# Patient Record
Sex: MALE | Race: BLACK OR AFRICAN AMERICAN | Employment: OTHER | URBAN - METROPOLITAN AREA
[De-identification: names, ages, dates, MRNs, and addresses within clinical notes are randomized per-mention and may not be internally consistent; named-entity substitution may affect disease eponyms.]

---

## 2024-05-02 ENCOUNTER — HOSPITAL ENCOUNTER (INPATIENT)
Age: 89
LOS: 1 days | Discharge: HOME | End: 2024-05-03
Attending: EMERGENCY MEDICINE | Admitting: STUDENT IN AN ORGANIZED HEALTH CARE EDUCATION/TRAINING PROGRAM
Payer: MEDICARE

## 2024-05-02 ENCOUNTER — APPOINTMENT (OUTPATIENT)
Dept: GENERAL RADIOLOGY | Age: 89
End: 2024-05-02
Payer: MEDICARE

## 2024-05-02 DIAGNOSIS — N18.6 ESRD ON HEMODIALYSIS (HCC): ICD-10-CM

## 2024-05-02 DIAGNOSIS — R53.1 GENERALIZED WEAKNESS: ICD-10-CM

## 2024-05-02 DIAGNOSIS — Z99.2 ESRD ON HEMODIALYSIS (HCC): ICD-10-CM

## 2024-05-02 DIAGNOSIS — I47.10 PAROXYSMAL SUPRAVENTRICULAR TACHYCARDIA (HCC): Primary | ICD-10-CM

## 2024-05-02 PROBLEM — I48.91 A-FIB (HCC): Status: ACTIVE | Noted: 2024-05-02

## 2024-05-02 LAB
ALBUMIN SERPL-MCNC: 4.4 G/DL (ref 3.4–5)
ALBUMIN/GLOB SERPL: 1.3 {RATIO} (ref 1.1–2.2)
ALP SERPL-CCNC: 105 U/L (ref 40–129)
ALT SERPL-CCNC: 11 U/L (ref 10–40)
ANION GAP SERPL CALCULATED.3IONS-SCNC: 16 MMOL/L (ref 3–16)
ANTI-XA UNFRAC HEPARIN: <0.1 IU/ML (ref 0.3–0.7)
APTT BLD: 33.8 SEC (ref 22.1–36.4)
AST SERPL-CCNC: 31 U/L (ref 15–37)
BACTERIA URNS QL MICRO: ABNORMAL /HPF
BASOPHILS # BLD: 0 K/UL (ref 0–0.2)
BASOPHILS NFR BLD: 0.5 %
BILIRUB SERPL-MCNC: 0.5 MG/DL (ref 0–1)
BILIRUB UR QL STRIP.AUTO: NEGATIVE
BUN SERPL-MCNC: 30 MG/DL (ref 7–20)
CALCIUM SERPL-MCNC: 10 MG/DL (ref 8.3–10.6)
CHLORIDE SERPL-SCNC: 96 MMOL/L (ref 99–110)
CLARITY UR: ABNORMAL
CO2 SERPL-SCNC: 23 MMOL/L (ref 21–32)
COLOR UR: YELLOW
CREAT SERPL-MCNC: 4.4 MG/DL (ref 0.8–1.3)
DEPRECATED RDW RBC AUTO: 14.5 % (ref 12.4–15.4)
EOSINOPHIL # BLD: 0.2 K/UL (ref 0–0.6)
EOSINOPHIL NFR BLD: 2 %
EPI CELLS #/AREA URNS AUTO: 5 /HPF (ref 0–5)
FLUAV RNA RESP QL NAA+PROBE: NOT DETECTED
FLUBV RNA RESP QL NAA+PROBE: NOT DETECTED
GFR SERPLBLD CREATININE-BSD FMLA CKD-EPI: 12 ML/MIN/{1.73_M2}
GLUCOSE SERPL-MCNC: 207 MG/DL (ref 70–99)
GLUCOSE UR STRIP.AUTO-MCNC: NEGATIVE MG/DL
HAV IGM SERPL QL IA: NORMAL
HBV CORE IGM SERPL QL IA: NORMAL
HBV SURFACE AB SERPL IA-ACNC: 5.56 MIU/ML
HBV SURFACE AG SERPL QL IA: NORMAL
HCT VFR BLD AUTO: 33.9 % (ref 40.5–52.5)
HCV AB SERPL QL IA: NORMAL
HGB BLD-MCNC: 11.1 G/DL (ref 13.5–17.5)
HGB UR QL STRIP.AUTO: NEGATIVE
HYALINE CASTS #/AREA URNS AUTO: 0 /LPF (ref 0–8)
INR PPP: 1.08 (ref 0.85–1.15)
KETONES UR STRIP.AUTO-MCNC: NEGATIVE MG/DL
LEUKOCYTE ESTERASE UR QL STRIP.AUTO: ABNORMAL
LIPASE SERPL-CCNC: 32 U/L (ref 13–60)
LYMPHOCYTES # BLD: 0.8 K/UL (ref 1–5.1)
LYMPHOCYTES NFR BLD: 10.1 %
MCH RBC QN AUTO: 32.4 PG (ref 26–34)
MCHC RBC AUTO-ENTMCNC: 32.8 G/DL (ref 31–36)
MCV RBC AUTO: 98.7 FL (ref 80–100)
MONOCYTES # BLD: 0.8 K/UL (ref 0–1.3)
MONOCYTES NFR BLD: 9.9 %
NEUTROPHILS # BLD: 6 K/UL (ref 1.7–7.7)
NEUTROPHILS NFR BLD: 77.5 %
NITRITE UR QL STRIP.AUTO: NEGATIVE
PH UR STRIP.AUTO: 7.5 [PH] (ref 5–8)
PLATELET # BLD AUTO: 184 K/UL (ref 135–450)
PMV BLD AUTO: 7.6 FL (ref 5–10.5)
POTASSIUM SERPL-SCNC: 4.5 MMOL/L (ref 3.5–5.1)
PROT SERPL-MCNC: 7.9 G/DL (ref 6.4–8.2)
PROT UR STRIP.AUTO-MCNC: ABNORMAL MG/DL
PROTHROMBIN TIME: 14.2 SEC (ref 11.9–14.9)
RBC # BLD AUTO: 3.44 M/UL (ref 4.2–5.9)
RBC CLUMPS #/AREA URNS AUTO: 1 /HPF (ref 0–4)
SARS-COV-2 RNA RESP QL NAA+PROBE: NOT DETECTED
SODIUM SERPL-SCNC: 135 MMOL/L (ref 136–145)
SP GR UR STRIP.AUTO: 1.02 (ref 1–1.03)
TROPONIN, HIGH SENSITIVITY: 126 NG/L (ref 0–22)
TROPONIN, HIGH SENSITIVITY: 167 NG/L (ref 0–22)
TROPONIN, HIGH SENSITIVITY: 178 NG/L (ref 0–22)
TROPONIN, HIGH SENSITIVITY: 77 NG/L (ref 0–22)
TSH SERPL DL<=0.005 MIU/L-ACNC: 2.47 UIU/ML (ref 0.27–4.2)
TSH SERPL DL<=0.005 MIU/L-ACNC: 3.53 UIU/ML (ref 0.27–4.2)
UA COMPLETE W REFLEX CULTURE PNL UR: YES
UA DIPSTICK W REFLEX MICRO PNL UR: YES
URN SPEC COLLECT METH UR: ABNORMAL
UROBILINOGEN UR STRIP-ACNC: 1 E.U./DL
WBC # BLD AUTO: 7.8 K/UL (ref 4–11)
WBC #/AREA URNS AUTO: 12 /HPF (ref 0–5)

## 2024-05-02 PROCEDURE — 5A1D70Z PERFORMANCE OF URINARY FILTRATION, INTERMITTENT, LESS THAN 6 HOURS PER DAY: ICD-10-PCS | Performed by: INTERNAL MEDICINE

## 2024-05-02 PROCEDURE — 96374 THER/PROPH/DIAG INJ IV PUSH: CPT

## 2024-05-02 PROCEDURE — 93306 TTE W/DOPPLER COMPLETE: CPT

## 2024-05-02 PROCEDURE — 84443 ASSAY THYROID STIM HORMONE: CPT

## 2024-05-02 PROCEDURE — 80074 ACUTE HEPATITIS PANEL: CPT

## 2024-05-02 PROCEDURE — 1200000000 HC SEMI PRIVATE

## 2024-05-02 PROCEDURE — 87086 URINE CULTURE/COLONY COUNT: CPT

## 2024-05-02 PROCEDURE — 93356 MYOCRD STRAIN IMG SPCKL TRCK: CPT

## 2024-05-02 PROCEDURE — 86706 HEP B SURFACE ANTIBODY: CPT

## 2024-05-02 PROCEDURE — 90935 HEMODIALYSIS ONE EVALUATION: CPT

## 2024-05-02 PROCEDURE — 36415 COLL VENOUS BLD VENIPUNCTURE: CPT

## 2024-05-02 PROCEDURE — 99285 EMERGENCY DEPT VISIT HI MDM: CPT

## 2024-05-02 PROCEDURE — 84484 ASSAY OF TROPONIN QUANT: CPT

## 2024-05-02 PROCEDURE — 2500000003 HC RX 250 WO HCPCS: Performed by: EMERGENCY MEDICINE

## 2024-05-02 PROCEDURE — 6360000002 HC RX W HCPCS: Performed by: INTERNAL MEDICINE

## 2024-05-02 PROCEDURE — 85730 THROMBOPLASTIN TIME PARTIAL: CPT

## 2024-05-02 PROCEDURE — 83690 ASSAY OF LIPASE: CPT

## 2024-05-02 PROCEDURE — 85520 HEPARIN ASSAY: CPT

## 2024-05-02 PROCEDURE — 2580000003 HC RX 258: Performed by: STUDENT IN AN ORGANIZED HEALTH CARE EDUCATION/TRAINING PROGRAM

## 2024-05-02 PROCEDURE — 80053 COMPREHEN METABOLIC PANEL: CPT

## 2024-05-02 PROCEDURE — 81001 URINALYSIS AUTO W/SCOPE: CPT

## 2024-05-02 PROCEDURE — 85610 PROTHROMBIN TIME: CPT

## 2024-05-02 PROCEDURE — 93005 ELECTROCARDIOGRAM TRACING: CPT | Performed by: EMERGENCY MEDICINE

## 2024-05-02 PROCEDURE — 6370000000 HC RX 637 (ALT 250 FOR IP): Performed by: STUDENT IN AN ORGANIZED HEALTH CARE EDUCATION/TRAINING PROGRAM

## 2024-05-02 PROCEDURE — 87636 SARSCOV2 & INF A&B AMP PRB: CPT

## 2024-05-02 PROCEDURE — 85025 COMPLETE CBC W/AUTO DIFF WBC: CPT

## 2024-05-02 PROCEDURE — 6360000002 HC RX W HCPCS: Performed by: STUDENT IN AN ORGANIZED HEALTH CARE EDUCATION/TRAINING PROGRAM

## 2024-05-02 PROCEDURE — 71045 X-RAY EXAM CHEST 1 VIEW: CPT

## 2024-05-02 RX ORDER — SODIUM CHLORIDE 9 MG/ML
INJECTION, SOLUTION INTRAVENOUS PRN
Status: DISCONTINUED | OUTPATIENT
Start: 2024-05-02 | End: 2024-05-03 | Stop reason: HOSPADM

## 2024-05-02 RX ORDER — CLOPIDOGREL BISULFATE 75 MG/1
75 TABLET ORAL DAILY
Status: DISCONTINUED | OUTPATIENT
Start: 2024-05-02 | End: 2024-05-03 | Stop reason: HOSPADM

## 2024-05-02 RX ORDER — DILTIAZEM HYDROCHLORIDE 5 MG/ML
10 INJECTION INTRAVENOUS ONCE
Status: COMPLETED | OUTPATIENT
Start: 2024-05-02 | End: 2024-05-02

## 2024-05-02 RX ORDER — BRIMONIDINE TARTRATE 2 MG/ML
1 SOLUTION/ DROPS OPHTHALMIC 2 TIMES DAILY
Status: DISCONTINUED | OUTPATIENT
Start: 2024-05-03 | End: 2024-05-03 | Stop reason: HOSPADM

## 2024-05-02 RX ORDER — HEPARIN SODIUM 10000 [USP'U]/100ML
5-30 INJECTION, SOLUTION INTRAVENOUS CONTINUOUS
Status: DISCONTINUED | OUTPATIENT
Start: 2024-05-02 | End: 2024-05-03

## 2024-05-02 RX ORDER — FLUTICASONE PROPIONATE 50 MCG
1 SPRAY, SUSPENSION (ML) NASAL DAILY PRN
Status: DISCONTINUED | OUTPATIENT
Start: 2024-05-02 | End: 2024-05-03 | Stop reason: HOSPADM

## 2024-05-02 RX ORDER — HEPARIN SODIUM 5000 [USP'U]/ML
5000 INJECTION, SOLUTION INTRAVENOUS; SUBCUTANEOUS EVERY 8 HOURS SCHEDULED
Status: DISCONTINUED | OUTPATIENT
Start: 2024-05-02 | End: 2024-05-02

## 2024-05-02 RX ORDER — CETIRIZINE HYDROCHLORIDE 10 MG/1
5 TABLET ORAL DAILY
Status: DISCONTINUED | OUTPATIENT
Start: 2024-05-02 | End: 2024-05-03 | Stop reason: HOSPADM

## 2024-05-02 RX ORDER — ACETAMINOPHEN 325 MG/1
650 TABLET ORAL EVERY 6 HOURS PRN
Status: DISCONTINUED | OUTPATIENT
Start: 2024-05-02 | End: 2024-05-03 | Stop reason: HOSPADM

## 2024-05-02 RX ORDER — ACETAMINOPHEN 650 MG/1
650 SUPPOSITORY RECTAL EVERY 6 HOURS PRN
Status: DISCONTINUED | OUTPATIENT
Start: 2024-05-02 | End: 2024-05-03 | Stop reason: HOSPADM

## 2024-05-02 RX ORDER — SODIUM CHLORIDE 0.9 % (FLUSH) 0.9 %
5-40 SYRINGE (ML) INJECTION PRN
Status: DISCONTINUED | OUTPATIENT
Start: 2024-05-02 | End: 2024-05-03 | Stop reason: HOSPADM

## 2024-05-02 RX ORDER — DORZOLAMIDE HCL 20 MG/ML
1 SOLUTION/ DROPS OPHTHALMIC 2 TIMES DAILY
Status: DISCONTINUED | OUTPATIENT
Start: 2024-05-02 | End: 2024-05-03 | Stop reason: HOSPADM

## 2024-05-02 RX ORDER — HEPARIN SODIUM 1000 [USP'U]/ML
60 INJECTION, SOLUTION INTRAVENOUS; SUBCUTANEOUS ONCE
Status: COMPLETED | OUTPATIENT
Start: 2024-05-02 | End: 2024-05-03

## 2024-05-02 RX ORDER — ONDANSETRON 2 MG/ML
4 INJECTION INTRAMUSCULAR; INTRAVENOUS EVERY 6 HOURS PRN
Status: DISCONTINUED | OUTPATIENT
Start: 2024-05-02 | End: 2024-05-03 | Stop reason: HOSPADM

## 2024-05-02 RX ORDER — LACTOBACILLUS RHAMNOSUS GG 10B CELL
1 CAPSULE ORAL 2 TIMES DAILY WITH MEALS
Status: DISCONTINUED | OUTPATIENT
Start: 2024-05-02 | End: 2024-05-03 | Stop reason: HOSPADM

## 2024-05-02 RX ORDER — ONDANSETRON 4 MG/1
4 TABLET, ORALLY DISINTEGRATING ORAL EVERY 8 HOURS PRN
Status: DISCONTINUED | OUTPATIENT
Start: 2024-05-02 | End: 2024-05-03 | Stop reason: HOSPADM

## 2024-05-02 RX ORDER — BRIMONIDINE TARTRATE 2 MG/ML
1 SOLUTION/ DROPS OPHTHALMIC 2 TIMES DAILY
Status: DISCONTINUED | OUTPATIENT
Start: 2024-05-02 | End: 2024-05-02

## 2024-05-02 RX ORDER — DORZOLAMIDE HYDROCHLORIDE AND TIMOLOL MALEATE 20; 5 MG/ML; MG/ML
1 SOLUTION/ DROPS OPHTHALMIC 2 TIMES DAILY
Status: DISCONTINUED | OUTPATIENT
Start: 2024-05-02 | End: 2024-05-02 | Stop reason: CLARIF

## 2024-05-02 RX ORDER — TIMOLOL MALEATE 5 MG/ML
1 SOLUTION/ DROPS OPHTHALMIC 2 TIMES DAILY
Status: DISCONTINUED | OUTPATIENT
Start: 2024-05-02 | End: 2024-05-03 | Stop reason: HOSPADM

## 2024-05-02 RX ORDER — ASPIRIN 81 MG/1
81 TABLET ORAL DAILY
Status: DISCONTINUED | OUTPATIENT
Start: 2024-05-02 | End: 2024-05-03 | Stop reason: HOSPADM

## 2024-05-02 RX ORDER — HYDRALAZINE HYDROCHLORIDE 50 MG/1
1 TABLET, FILM COATED ORAL 3 TIMES DAILY
COMMUNITY
Start: 2023-06-07

## 2024-05-02 RX ORDER — HYDRALAZINE HYDROCHLORIDE 25 MG/1
25 TABLET, FILM COATED ORAL 3 TIMES DAILY
Status: DISCONTINUED | OUTPATIENT
Start: 2024-05-02 | End: 2024-05-02 | Stop reason: SDUPTHER

## 2024-05-02 RX ORDER — DORZOLAMIDE HYDROCHLORIDE AND TIMOLOL MALEATE 20; 5 MG/ML; MG/ML
1 SOLUTION/ DROPS OPHTHALMIC 2 TIMES DAILY
COMMUNITY
Start: 2024-04-05

## 2024-05-02 RX ORDER — HEPARIN SODIUM 1000 [USP'U]/ML
30 INJECTION, SOLUTION INTRAVENOUS; SUBCUTANEOUS PRN
Status: DISCONTINUED | OUTPATIENT
Start: 2024-05-02 | End: 2024-05-03

## 2024-05-02 RX ORDER — DILTIAZEM HCL IN NACL,ISO-OSM 125 MG/125
2.5-15 PLASTIC BAG, INJECTION (ML) INTRAVENOUS CONTINUOUS
Status: DISPENSED | OUTPATIENT
Start: 2024-05-02 | End: 2024-05-03

## 2024-05-02 RX ORDER — HEPARIN SODIUM 1000 [USP'U]/ML
3500 INJECTION, SOLUTION INTRAVENOUS; SUBCUTANEOUS PRN
Status: DISCONTINUED | OUTPATIENT
Start: 2024-05-02 | End: 2024-05-03 | Stop reason: HOSPADM

## 2024-05-02 RX ORDER — HYDRALAZINE HYDROCHLORIDE 25 MG/1
25 TABLET, FILM COATED ORAL 3 TIMES DAILY
COMMUNITY
Start: 2023-07-19

## 2024-05-02 RX ORDER — DILTIAZEM HYDROCHLORIDE 300 MG/1
300 CAPSULE, COATED, EXTENDED RELEASE ORAL DAILY
Status: ON HOLD | COMMUNITY
Start: 2024-01-26 | End: 2024-05-03 | Stop reason: HOSPADM

## 2024-05-02 RX ORDER — POLYETHYLENE GLYCOL 3350 17 G/17G
17 POWDER, FOR SOLUTION ORAL DAILY PRN
Status: DISCONTINUED | OUTPATIENT
Start: 2024-05-02 | End: 2024-05-03 | Stop reason: HOSPADM

## 2024-05-02 RX ORDER — SODIUM CHLORIDE 0.9 % (FLUSH) 0.9 %
5-40 SYRINGE (ML) INJECTION EVERY 12 HOURS SCHEDULED
Status: DISCONTINUED | OUTPATIENT
Start: 2024-05-02 | End: 2024-05-03 | Stop reason: HOSPADM

## 2024-05-02 RX ORDER — HYDRALAZINE HYDROCHLORIDE 25 MG/1
75 TABLET, FILM COATED ORAL 3 TIMES DAILY
Status: DISCONTINUED | OUTPATIENT
Start: 2024-05-02 | End: 2024-05-03 | Stop reason: HOSPADM

## 2024-05-02 RX ORDER — HEPARIN SODIUM 1000 [USP'U]/ML
60 INJECTION, SOLUTION INTRAVENOUS; SUBCUTANEOUS PRN
Status: DISCONTINUED | OUTPATIENT
Start: 2024-05-02 | End: 2024-05-03

## 2024-05-02 RX ADMIN — SODIUM CHLORIDE, PRESERVATIVE FREE 10 ML: 5 INJECTION INTRAVENOUS at 21:19

## 2024-05-02 RX ADMIN — DORZOLAMIDE HYDROCHLORIDE 1 DROP: 20 SOLUTION/ DROPS OPHTHALMIC at 21:23

## 2024-05-02 RX ADMIN — CLOPIDOGREL BISULFATE 75 MG: 75 TABLET ORAL at 14:09

## 2024-05-02 RX ADMIN — DILTIAZEM HYDROCHLORIDE 10 MG: 5 INJECTION, SOLUTION INTRAVENOUS at 08:34

## 2024-05-02 RX ADMIN — DORZOLAMIDE HYDROCHLORIDE 1 DROP: 20 SOLUTION/ DROPS OPHTHALMIC at 14:09

## 2024-05-02 RX ADMIN — TIMOLOL MALEATE 1 DROP: 5 SOLUTION OPHTHALMIC at 21:22

## 2024-05-02 RX ADMIN — BRIMONIDINE TARTRATE 1 DROP: 2 SOLUTION/ DROPS OPHTHALMIC at 14:08

## 2024-05-02 RX ADMIN — Medication 1 CAPSULE: at 21:19

## 2024-05-02 RX ADMIN — HEPARIN SODIUM 5000 UNITS: 5000 INJECTION INTRAVENOUS; SUBCUTANEOUS at 14:09

## 2024-05-02 RX ADMIN — CETIRIZINE HYDROCHLORIDE 5 MG: 10 TABLET, FILM COATED ORAL at 21:19

## 2024-05-02 RX ADMIN — ASPIRIN 81 MG: 81 TABLET, COATED ORAL at 14:09

## 2024-05-02 RX ADMIN — BRIMONIDINE TARTRATE 1 DROP: 2 SOLUTION/ DROPS OPHTHALMIC at 21:22

## 2024-05-02 RX ADMIN — ACETAMINOPHEN 650 MG: 325 TABLET ORAL at 14:15

## 2024-05-02 RX ADMIN — HYDRALAZINE HYDROCHLORIDE 75 MG: 25 TABLET ORAL at 14:09

## 2024-05-02 RX ADMIN — HEPARIN SODIUM 3500 UNITS: 1000 INJECTION INTRAVENOUS; SUBCUTANEOUS at 19:07

## 2024-05-02 RX ADMIN — WATER 1000 MG: 1 INJECTION INTRAMUSCULAR; INTRAVENOUS; SUBCUTANEOUS at 21:18

## 2024-05-02 ASSESSMENT — LIFESTYLE VARIABLES
HOW MANY STANDARD DRINKS CONTAINING ALCOHOL DO YOU HAVE ON A TYPICAL DAY: PATIENT DOES NOT DRINK
HOW OFTEN DO YOU HAVE A DRINK CONTAINING ALCOHOL: NEVER

## 2024-05-02 ASSESSMENT — PAIN SCALES - GENERAL: PAINLEVEL_OUTOF10: 3

## 2024-05-02 ASSESSMENT — PAIN - FUNCTIONAL ASSESSMENT: PAIN_FUNCTIONAL_ASSESSMENT: NONE - DENIES PAIN

## 2024-05-02 ASSESSMENT — PAIN DESCRIPTION - LOCATION: LOCATION: HEAD

## 2024-05-02 NOTE — PROGRESS NOTES
Pt taken to echo. HD nurse called while pt was off floor. Plan is for transport to take pt from echo to HD.

## 2024-05-02 NOTE — PROGRESS NOTES
Treatment time: 3  hrs    Net UF: 1000 ml     Pre weight: 49.8 kg  Post weight: 48.8 kg     Access used: Rtdc  Access function:  tolerated well,  BFR 350ml/min     Medications or blood products given: heparin dwells     Regular outpatient schedule: TBD     Summary of response to treatment: Pt tolerated well. Pt remained stable throughout entire treatment and upon exiting the hemodialysis suite.      Copy of dialysis treatment record placed in chart, to be scanned into EMR.

## 2024-05-02 NOTE — ED PROVIDER NOTES
narrow complex tachycardia with nonspecific ST-T morphology, suspect likely atrial flutter, no STEMI      Radiology  XR CHEST PORTABLE   Final Result   No acute pulmonary process.             Any applicable radiology studies including x-ray, CT, MRI, and/or ultrasound, were reviewed independently by me in addition to the radiologist.  I reviewed all radiology images and reports as well from this evaluation.    Labs  Results for orders placed or performed during the hospital encounter of 05/02/24   COVID-19 & Influenza Combo    Specimen: Nasopharyngeal Swab   Result Value Ref Range    SARS-CoV-2 RNA, RT PCR NOT DETECTED NOT DETECTED    Influena B NOT DETECTED NOT DETECTED    Influzena A NOT DETECTED NOT DETECTED   CBC with Auto Differential   Result Value Ref Range    WBC 7.8 4.0 - 11.0 K/uL    RBC 3.44 (L) 4.20 - 5.90 M/uL    Hemoglobin 11.1 (L) 13.5 - 17.5 g/dL    Hematocrit 33.9 (L) 40.5 - 52.5 %    MCV 98.7 80.0 - 100.0 fL    MCH 32.4 26.0 - 34.0 pg    MCHC 32.8 31.0 - 36.0 g/dL    RDW 14.5 12.4 - 15.4 %    Platelets 184 135 - 450 K/uL    MPV 7.6 5.0 - 10.5 fL    Neutrophils % 77.5 %    Lymphocytes % 10.1 %    Monocytes % 9.9 %    Eosinophils % 2.0 %    Basophils % 0.5 %    Neutrophils Absolute 6.0 1.7 - 7.7 K/uL    Lymphocytes Absolute 0.8 (L) 1.0 - 5.1 K/uL    Monocytes Absolute 0.8 0.0 - 1.3 K/uL    Eosinophils Absolute 0.2 0.0 - 0.6 K/uL    Basophils Absolute 0.0 0.0 - 0.2 K/uL   Comprehensive Metabolic Panel w/ Reflex to MG   Result Value Ref Range    Sodium 135 (L) 136 - 145 mmol/L    Potassium reflex Magnesium 4.5 3.5 - 5.1 mmol/L    Chloride 96 (L) 99 - 110 mmol/L    CO2 23 21 - 32 mmol/L    Anion Gap 16 3 - 16    Glucose 207 (H) 70 - 99 mg/dL    BUN 30 (H) 7 - 20 mg/dL    Creatinine 4.4 (H) 0.8 - 1.3 mg/dL    Est, Glom Filt Rate 12 (A) >60    Calcium 10.0 8.3 - 10.6 mg/dL    Total Protein 7.9 6.4 - 8.2 g/dL    Albumin 4.4 3.4 - 5.0 g/dL    Albumin/Globulin Ratio 1.3 1.1 - 2.2    Total Bilirubin 0.5 0.0 -  Remainder of laboratory evaluation consistent with known ESRD, no critical electrolyte derangement.  Patient denies history of cardiac dysrhythmia, however this is documented on his past medical history and patient has apparently taken diltiazem in the past, unclear if he is still taking this medication currently.  Patient received intravenous diltiazem after which he converted to normal sinus rhythm confirmed on repeat EKG.  Given patient's advanced age, cardiac dysrhythmia with troponin elevation and need for routine dialysis today, will plan for admission for further evaluation and care.  Case discussed with internal medicine team who will admit.  Patient alert, hemodynamically stable and in no distress at time of admission.    During the patient's ED course, the patient was given:  Medications   dilTIAZem injection 10 mg (10 mg IntraVENous Given 5/2/24 0834)     Followed by   dilTIAZem HCl-Sodium Chloride 125 mg / 125 mL infusion (0 mg/hr IntraVENous Held 5/2/24 0904)        Consults:  None        History obtained from: Patient and EMS    Pertinent social determinants of health: None applicable    Chronic conditions potentially affecting care:  ESRD on hemodialysis, diabetes    Review of other records:  Inpatient notes from outside hospital admission from 5/11/23 and Outpatient notes by PCP office from 11/3/23    Reassessment:  See MDM for details of medications given and reassessment      I Dr. Love am the primary clinician of record.      Critical Care Time    Upon my evaluation, this patient had a high probability of imminent or life-threatening deterioration due to cardiac dysrhythmia requiring intravenous cardioactive medications which required my direct attention, intervention, and personal management.    The total critical care time personally spent while evaluating and treating this patient was 32 minutes exclusive of any time spent doing separately billable procedures.  This includes time at the

## 2024-05-02 NOTE — H&P
CC : Tachycardia    History Obtained From:  patient    HISTORY OF PRESENT ILLNESS:    Patient is a 89 yo M with PMHx of ESRD on HD (TThSa, Dry wt 54 kg, TDC access), HTN, HLD, GERD, Lumbar radiculopathy, AAA s/p repair in 2018 who presented from his dialysis session with tachycardia. Patient endorses that for the past couple of days he has felt more fatigued. He denies any CP, SOB, orthopnea or PND. He claims that today he continued to not feel well and at his dialysis session. Patient denies fevers, chills, nausea, vomiting, chest pain, shortness of breath, diarrhea, constipation, dysuria, urinary frequency or urgency.     Plan:     -Echo  -Continue Dilt gtt for now, transition to his home p.o Dilt tomorrow morning  -IV Rocephin  -Probiotics  -Hold Hydralazine for now in light of tachycardia/SVT    Assessment:   Devonte Carrillo Jr. is a 89 y.o. male with PMH of ESRD on HD (TThSa, Dry wt 54 kg, TDC access), HTN, HLD, GERD, Lumbar radiculopathy, AAA s/p repair in 2018 who was admitted with Tachycardia    SVT  Patient does have hx of SVT. Per cardiology, Dr. Ornelas, \"He had SVT at [facility] when he had the AAA reprair and is on diltiazem for this. He has had no recurrence.\"    UTI  Patient endorses that he still makes urine. His UA showed trace leucocyte esterase, 12 WBC, 2+ bacteria    ESRD on HD  Patient went into ESRD after he had his AAA repaired. He is on TThSa schedule, Dry wt 54 kg, TDC access  -Nephrology consulted    CAD  At home, patient in on Plavix + ASA    AAA s/p repair  In Oct 2018 he underwent an endovascular repair of the AAA by Dr Sotelo with a favorable result    HTN  At home, patient is on Hydralazine, Losartan    GERD  At home, patient is on Protonix    Code Status: Full Code   FEN: ADULT DIET; Regular; Low Sodium (2 gm); Low Potassium (Less than 3000 mg/day); 1800 ml   DVT PPX: [] Lovenox, [x]Heparin, [] SCDs, [] Eliquis, [] Xarelto, [] Coumadin  DISPO: Continue mgmt of acute issues    Jamari N Patricia,  He has never used smokeless tobacco.  ETOH:   reports no history of alcohol use.  DRUGS : denies use  Patient currently lives alone    Family History:       Problem Relation Age of Onset    Diabetes Mother     Stroke Father        DATA:    Labs:  CBC:   Recent Labs     05/02/24  0801   WBC 7.8   HGB 11.1*   HCT 33.9*          BMP:   Recent Labs     05/02/24  0801   *   K 4.5   CL 96*   CO2 23   BUN 30*   CREATININE 4.4*   GLUCOSE 207*     LFT's:   Recent Labs     05/02/24  0801   AST 31   ALT 11   BILITOT 0.5   ALKPHOS 105     Troponin: No results for input(s): \"TROPONINI\" in the last 72 hours.  BNP:No results for input(s): \"BNP\" in the last 72 hours.  ABGs: No results for input(s): \"PHART\", \"RAF4QPA\", \"PO2ART\" in the last 72 hours.  INR: No results for input(s): \"INR\" in the last 72 hours.    U/A:  Recent Labs     05/02/24  1414   COLORU Yellow   PHUR 7.5   WBCUA 12*   RBCUA 1   BACTERIA 2+*   CLARITYU CLOUDY*   LEUKOCYTESUR TRACE*   UROBILINOGEN 1.0   BILIRUBINUR Negative   BLOODU Negative   GLUCOSEU Negative       XR CHEST PORTABLE   Final Result   No acute pulmonary process.

## 2024-05-02 NOTE — PROGRESS NOTES
Patient seen in ED, room 11.  Admission completed with the following exceptions:  4 Eyes Assessment, Immunizations, Vaccines, Rights and Responsibilities, Orientation to room, Plan of Care, Education/Learning Assessment and Education Plan, white board, height and weight, pain assessment and head to toe assessment.  Patient is alert and oriented X 4.  Patient lives in a bilevel house with his wife and is being admitted for A-fib.  Home Medications as well as Outside Sources have been verbally reviewed with patient and updated if appropriate.  Medication reconciliation is now Completed.  All questions answered.

## 2024-05-02 NOTE — ACP (ADVANCE CARE PLANNING)
Advanced Care Planning Note    Purpose of Encounter: Advanced care planning in light of tachycardia  Parties In Attendance: Patient[employer]res (Healthcare POA)  Decisional Capacity: Yes  Subjective: Patient has fatigue  Objective: Cr 4.4  Goals of Care Determination: Patient wants Full support  Plan:  Dilt drip, IV Abx, Echo  Code Status: Full   Time spent on Advanced care Plannin minutes  Advanced Care Planning Documents: Completed advanced directives on chart, Tres, is the Healthcare POA.    Jamari Gomez MD  2024 5:40 PM

## 2024-05-02 NOTE — FLOWSHEET NOTE
05/02/24 1251   Vital Signs   Temp 98.5 °F (36.9 °C)   Temp Source Temporal   Pulse 77   Heart Rate Source Monitor   Respirations 18   BP (!) 152/83   MAP (Calculated) 106   Patient Position Semi fowlers   Pain Assessment   Pain Assessment None - Denies Pain   Oxygen Therapy   SpO2 100 %   O2 Device None (Room air)     Pt admitted to room 5911. Oriented to room and call light system.  Sons at bedside. Pt NSR on tele. Stated he did not get his HD treatment tonight d/t elevated HR and pt not feeling well.  Updated on POC. Fall precautions in place for pts safety.

## 2024-05-03 VITALS
DIASTOLIC BLOOD PRESSURE: 70 MMHG | WEIGHT: 109.6 LBS | TEMPERATURE: 96.7 F | OXYGEN SATURATION: 100 % | SYSTOLIC BLOOD PRESSURE: 149 MMHG | HEART RATE: 80 BPM | BODY MASS INDEX: 17.61 KG/M2 | HEIGHT: 66 IN | RESPIRATION RATE: 16 BRPM

## 2024-05-03 DIAGNOSIS — I47.10 SVT (SUPRAVENTRICULAR TACHYCARDIA) (HCC): Primary | ICD-10-CM

## 2024-05-03 DIAGNOSIS — R00.0 TACHYCARDIA: Primary | ICD-10-CM

## 2024-05-03 DIAGNOSIS — R00.0 TACHYCARDIA: ICD-10-CM

## 2024-05-03 LAB
ALBUMIN SERPL-MCNC: 4 G/DL (ref 3.4–5)
ALBUMIN/GLOB SERPL: 1.2 {RATIO} (ref 1.1–2.2)
ALP SERPL-CCNC: 98 U/L (ref 40–129)
ALT SERPL-CCNC: 10 U/L (ref 10–40)
ANION GAP SERPL CALCULATED.3IONS-SCNC: 13 MMOL/L (ref 3–16)
ANTI-XA UNFRAC HEPARIN: 0.2 IU/ML (ref 0.3–0.7)
ANTI-XA UNFRAC HEPARIN: 0.37 IU/ML (ref 0.3–0.7)
AST SERPL-CCNC: 23 U/L (ref 15–37)
BACTERIA UR CULT: NORMAL
BASOPHILS # BLD: 0 K/UL (ref 0–0.2)
BASOPHILS NFR BLD: 0.6 %
BILIRUB SERPL-MCNC: 0.4 MG/DL (ref 0–1)
BUN SERPL-MCNC: 17 MG/DL (ref 7–20)
CALCIUM SERPL-MCNC: 10.1 MG/DL (ref 8.3–10.6)
CHLORIDE SERPL-SCNC: 101 MMOL/L (ref 99–110)
CO2 SERPL-SCNC: 24 MMOL/L (ref 21–32)
CREAT SERPL-MCNC: 3.5 MG/DL (ref 0.8–1.3)
DEPRECATED RDW RBC AUTO: 14.4 % (ref 12.4–15.4)
EKG ATRIAL RATE: 80 BPM
EKG ATRIAL RATE: 86 BPM
EKG DIAGNOSIS: NORMAL
EKG P AXIS: 47 DEGREES
EKG P AXIS: 49 DEGREES
EKG P-R INTERVAL: 168 MS
EKG P-R INTERVAL: 310 MS
EKG Q-T INTERVAL: 318 MS
EKG Q-T INTERVAL: 380 MS
EKG Q-T INTERVAL: 400 MS
EKG QRS DURATION: 104 MS
EKG QRS DURATION: 82 MS
EKG QRS DURATION: 90 MS
EKG QTC CALCULATION (BAZETT): 454 MS
EKG QTC CALCULATION (BAZETT): 461 MS
EKG QTC CALCULATION (BAZETT): 483 MS
EKG R AXIS: -30 DEGREES
EKG R AXIS: -34 DEGREES
EKG R AXIS: -42 DEGREES
EKG T AXIS: 106 DEGREES
EKG T AXIS: 41 DEGREES
EKG T AXIS: 84 DEGREES
EKG VENTRICULAR RATE: 139 BPM
EKG VENTRICULAR RATE: 80 BPM
EKG VENTRICULAR RATE: 86 BPM
EOSINOPHIL # BLD: 0.2 K/UL (ref 0–0.6)
EOSINOPHIL NFR BLD: 2.1 %
GFR SERPLBLD CREATININE-BSD FMLA CKD-EPI: 15 ML/MIN/{1.73_M2}
GLUCOSE SERPL-MCNC: 85 MG/DL (ref 70–99)
HCT VFR BLD AUTO: 32.7 % (ref 40.5–52.5)
HGB BLD-MCNC: 10.8 G/DL (ref 13.5–17.5)
LYMPHOCYTES # BLD: 1.7 K/UL (ref 1–5.1)
LYMPHOCYTES NFR BLD: 21.5 %
MAGNESIUM SERPL-MCNC: 2 MG/DL (ref 1.8–2.4)
MCH RBC QN AUTO: 32.5 PG (ref 26–34)
MCHC RBC AUTO-ENTMCNC: 33.1 G/DL (ref 31–36)
MCV RBC AUTO: 98 FL (ref 80–100)
MONOCYTES # BLD: 1.1 K/UL (ref 0–1.3)
MONOCYTES NFR BLD: 13.5 %
NEUTROPHILS # BLD: 4.9 K/UL (ref 1.7–7.7)
NEUTROPHILS NFR BLD: 62.3 %
PLATELET # BLD AUTO: 168 K/UL (ref 135–450)
PMV BLD AUTO: 7.6 FL (ref 5–10.5)
POTASSIUM SERPL-SCNC: 5.1 MMOL/L (ref 3.5–5.1)
PROT SERPL-MCNC: 7.4 G/DL (ref 6.4–8.2)
RBC # BLD AUTO: 3.34 M/UL (ref 4.2–5.9)
SODIUM SERPL-SCNC: 138 MMOL/L (ref 136–145)
TROPONIN, HIGH SENSITIVITY: 170 NG/L (ref 0–22)
TROPONIN, HIGH SENSITIVITY: 171 NG/L (ref 0–22)
WBC # BLD AUTO: 7.8 K/UL (ref 4–11)

## 2024-05-03 PROCEDURE — 93010 ELECTROCARDIOGRAM REPORT: CPT | Performed by: INTERNAL MEDICINE

## 2024-05-03 PROCEDURE — 85025 COMPLETE CBC W/AUTO DIFF WBC: CPT

## 2024-05-03 PROCEDURE — 6370000000 HC RX 637 (ALT 250 FOR IP): Performed by: INTERNAL MEDICINE

## 2024-05-03 PROCEDURE — 99223 1ST HOSP IP/OBS HIGH 75: CPT | Performed by: INTERNAL MEDICINE

## 2024-05-03 PROCEDURE — 97165 OT EVAL LOW COMPLEX 30 MIN: CPT

## 2024-05-03 PROCEDURE — 97530 THERAPEUTIC ACTIVITIES: CPT

## 2024-05-03 PROCEDURE — 97535 SELF CARE MNGMENT TRAINING: CPT

## 2024-05-03 PROCEDURE — 80053 COMPREHEN METABOLIC PANEL: CPT

## 2024-05-03 PROCEDURE — 84484 ASSAY OF TROPONIN QUANT: CPT

## 2024-05-03 PROCEDURE — 94760 N-INVAS EAR/PLS OXIMETRY 1: CPT

## 2024-05-03 PROCEDURE — 85520 HEPARIN ASSAY: CPT

## 2024-05-03 PROCEDURE — 83735 ASSAY OF MAGNESIUM: CPT

## 2024-05-03 PROCEDURE — 93005 ELECTROCARDIOGRAM TRACING: CPT | Performed by: STUDENT IN AN ORGANIZED HEALTH CARE EDUCATION/TRAINING PROGRAM

## 2024-05-03 PROCEDURE — 2580000003 HC RX 258: Performed by: STUDENT IN AN ORGANIZED HEALTH CARE EDUCATION/TRAINING PROGRAM

## 2024-05-03 PROCEDURE — 6370000000 HC RX 637 (ALT 250 FOR IP): Performed by: STUDENT IN AN ORGANIZED HEALTH CARE EDUCATION/TRAINING PROGRAM

## 2024-05-03 PROCEDURE — 97161 PT EVAL LOW COMPLEX 20 MIN: CPT

## 2024-05-03 PROCEDURE — 6360000002 HC RX W HCPCS: Performed by: STUDENT IN AN ORGANIZED HEALTH CARE EDUCATION/TRAINING PROGRAM

## 2024-05-03 PROCEDURE — 36415 COLL VENOUS BLD VENIPUNCTURE: CPT

## 2024-05-03 RX ORDER — METOPROLOL SUCCINATE 25 MG/1
75 TABLET, EXTENDED RELEASE ORAL DAILY
Qty: 270 TABLET | Refills: 0 | Status: SHIPPED | OUTPATIENT
Start: 2024-05-03 | End: 2024-08-01

## 2024-05-03 RX ORDER — LABETALOL HYDROCHLORIDE 5 MG/ML
10 INJECTION, SOLUTION INTRAVENOUS EVERY 6 HOURS PRN
Status: DISCONTINUED | OUTPATIENT
Start: 2024-05-03 | End: 2024-05-03 | Stop reason: HOSPADM

## 2024-05-03 RX ADMIN — HEPARIN SODIUM 3000 UNITS: 1000 INJECTION INTRAVENOUS; SUBCUTANEOUS at 00:01

## 2024-05-03 RX ADMIN — DILTIAZEM HYDROCHLORIDE 300 MG: 180 CAPSULE, EXTENDED RELEASE ORAL at 08:05

## 2024-05-03 RX ADMIN — BRIMONIDINE TARTRATE 1 DROP: 2 SOLUTION/ DROPS OPHTHALMIC at 08:09

## 2024-05-03 RX ADMIN — ACETAMINOPHEN 650 MG: 325 TABLET ORAL at 00:12

## 2024-05-03 RX ADMIN — METOPROLOL SUCCINATE 75 MG: 25 TABLET, EXTENDED RELEASE ORAL at 14:40

## 2024-05-03 RX ADMIN — HEPARIN SODIUM 12 UNITS/KG/HR: 10000 INJECTION, SOLUTION INTRAVENOUS at 00:03

## 2024-05-03 RX ADMIN — DORZOLAMIDE HYDROCHLORIDE 1 DROP: 20 SOLUTION/ DROPS OPHTHALMIC at 08:09

## 2024-05-03 RX ADMIN — Medication 1 CAPSULE: at 08:05

## 2024-05-03 RX ADMIN — CLOPIDOGREL BISULFATE 75 MG: 75 TABLET ORAL at 08:05

## 2024-05-03 RX ADMIN — ASPIRIN 81 MG: 81 TABLET, COATED ORAL at 08:05

## 2024-05-03 RX ADMIN — HYDRALAZINE HYDROCHLORIDE 75 MG: 25 TABLET ORAL at 14:40

## 2024-05-03 RX ADMIN — TIMOLOL MALEATE 1 DROP: 5 SOLUTION OPHTHALMIC at 08:09

## 2024-05-03 RX ADMIN — SODIUM CHLORIDE, PRESERVATIVE FREE 10 ML: 5 INJECTION INTRAVENOUS at 08:05

## 2024-05-03 RX ADMIN — HYDRALAZINE HYDROCHLORIDE 75 MG: 25 TABLET ORAL at 08:05

## 2024-05-03 ASSESSMENT — ENCOUNTER SYMPTOMS
VOMITING: 0
DIARRHEA: 0
COUGH: 0
WHEEZING: 0
ABDOMINAL PAIN: 0
BACK PAIN: 0
SHORTNESS OF BREATH: 0
NAUSEA: 0
CONSTIPATION: 0

## 2024-05-03 NOTE — CONSULTS
Financial Resource Strain: Not on file   Food Insecurity: No Food Insecurity (5/2/2024)    Hunger Vital Sign     Worried About Running Out of Food in the Last Year: Never true     Ran Out of Food in the Last Year: Never true   Transportation Needs: No Transportation Needs (5/2/2024)    PRAPARE - Transportation     Lack of Transportation (Medical): No     Lack of Transportation (Non-Medical): No   Physical Activity: Not on file   Stress: Not on file   Social Connections: Not on file   Intimate Partner Violence: Not on file   Housing Stability: Low Risk  (5/2/2024)    Housing Stability Vital Sign     Unable to Pay for Housing in the Last Year: No     Number of Places Lived in the Last Year: 1     Unstable Housing in the Last Year: No       Family History:       Problem Relation Age of Onset    Diabetes Mother     Stroke Father        Review of Systems   Constitutional:  Negative for chills and fever.   Respiratory:  Negative for cough, shortness of breath and wheezing.    Cardiovascular:  Negative for chest pain, palpitations and leg swelling.   Gastrointestinal:  Negative for abdominal pain, constipation, diarrhea, nausea and vomiting.   Genitourinary:  Negative for difficulty urinating, dysuria, hematuria and urgency.   Musculoskeletal:  Negative for arthralgias, back pain and myalgias.   Skin:  Negative for pallor and rash.   Neurological:  Negative for dizziness, tremors, syncope and headaches.   Psychiatric/Behavioral:  Negative for confusion and hallucinations.        PHYSICAL EXAM:      Vitals:    BP (!) 149/70   Pulse 80   Temp (!) 96.7 °F (35.9 °C) (Temporal)   Resp 16   Ht 1.676 m (5' 6\")   Wt 49.7 kg (109 lb 9.6 oz)   SpO2 100%   BMI 17.69 kg/m²   I/O last 3 completed shifts:  In: -   Out: 275 [Urine:275]  No intake/output data recorded.    Physical Exam:  General : Elderly male, AAOx3, not in pain or respiratory distress, see me in the chair  HEENT : normocephalic, atraumatic, mucosa moist, no  the care of this patient.  I will continue to follow along.  Please call with questions.    Beata Ocampo MD  Office Phone : [phone]  5/3/2024

## 2024-05-03 NOTE — PROGRESS NOTES
Pt had episode of tachycardia 150s lasting 9 minutes, pt asymptomatic. EKG taken, showing A-fib RVR. Pt converted to NSR during end of EKG reading.

## 2024-05-03 NOTE — PROGRESS NOTES
CLINICAL PHARMACY NOTE: MEDS TO BEDS    Total # of Prescriptions Filled: 1   The following medications were delivered to the patient:  METOPROLOL SUCCINATE ER     Additional Documentation:  Delivered to patients room = signed  Ok to be delivered per RN La Gonzalez - Pharmacy Tech.

## 2024-05-03 NOTE — CARE COORDINATION
Case Management -  Discharge Note      Patient Name: Devonte Carrillo Jr.                   YOB: 1929            Readmission Risk (Low < 19, Mod (19-27), High > 27): Readmission Risk Score: 16.5    Current PCP: Karen Barboza    ([facility]) Important Message from Medicare:    Date: 5/2/24    PT AM-PAC: 20 /24  OT AM-PAC: 19 /24    Patient/patient representative has been educated on the benefits of Home Health Care as well as the possible risks of declining recommended services. Patient/patient representative has acknowledged the information provided and decided on the following discharge plan. Patient/ patient representative has been provided freedom of choice regarding service provider, supported by basic dialogue that supports the patient's individualized plan of care/goals.    Spoke with patient and family about home health services. Patient and family decline this service, stating he drives himself several times a week, anywhere he needs and they are not interested in home health care services.    Financial    Payor: [employer] MEDICARE / Plan: [employer] GOLD PLUS HMO / Product Type: *No Product type* /     Pharmacy:  Potential assistance Purchasing Medications: No  Meds-to-Beds request: Yes      [id number] - [place], [place] - [address] - P [phone] - F [phone]  [facility] [address]  [facility] [address]  Phone: [phone] Fax: [phone]    [facility] - [facility] [address] - P [phone] - F [phone] [address] [facility] [address]  Phone: [phone] Fax: [phone]      Notes:    Patient lives with wife and is very active. He is still driving himself to dialysis and any place he thinks he needs to go.  Their son provides great support for patient and family as needed.     Analilia Srinivasan RNCM

## 2024-05-03 NOTE — DISCHARGE SUMMARY
V2.0  Discharge Summary    Name:  Devonte Carrillo Jr. /Age/Sex: 3/3/1929 (89 y.o. male)   Admit Date: 2024  Discharging Provider: Jamari Gomez MD   MRN & CSN:  [id number] & [id number] Attending:  Jamari Gomez MD       Brief HPI: Devonte Carrillo Jr. is a 89 y.o. male with PMH of ESRD on HD (TThSa, Dry wt 54 kg, TDC access), HTN, HLD, GERD, Lumbar radiculopathy, AAA s/p repair in  who was admitted with SVT from his dialysis session. Patient was seen by cardiology. His p.o Dilt was changed to p.o Toprol 75mg daily. Patient improved and was discharged home.     Brief Problem Focused Hospital Course:     SVT  Patient does have hx of SVT. Per cardiology, Dr. Ornelas, \"He had SVT at [facility] when he had the AAA reprair and is on diltiazem for this. He has had no recurrence.\"     UTI  Patient endorses that he still makes urine. His UA showed trace leucocyte esterase, 12 WBC, 2+ bacteria     ESRD on HD  Patient went into ESRD after he had his AAA repaired. He is on TThSa schedule, Dry wt 54 kg, TDC access  -Nephrology consulted     CAD  At home, patient in on Plavix + ASA     AAA s/p repair  In Oct 2018 he underwent an endovascular repair of the AAA by Dr Sotelo with a favorable result.     HTN  At home, patient is on Hydralazine, Losartan     GERD  At home, patient is on Protonix    The patient expressed appropriate understanding of, and agreement with the discharge recommendations, medications, and plan.     Consults this admission:  IP CONSULT TO HOSPITALIST  IP CONSULT TO NEPHROLOGY  IP CONSULT TO CARDIOLOGY    Discharge Instruction:   Primary care physician: Karen Barboza within 2 weeks  Disposition: Discharged to: [x]Home, []HHC, []SNF, []Acute Rehab, []Hospice []AMA  Condition on discharge: Stable    Physical Exam:   Physical Exam  HENT:      Head: Normocephalic and atraumatic.      Mouth/Throat:      Mouth: Mucous membranes are moist.   Cardiovascular:      Rate and Rhythm: Regular rhythm.      Pulses: Normal  5/2/2024 8:20 am COMPARISON: 09/02/2012 HISTORY: ORDERING SYSTEM PROVIDED HISTORY: tachycardia, weakness TECHNOLOGIST PROVIDED HISTORY: Reason for exam:->tachycardia, weakness Reason for Exam: tachycardia, weakness FINDINGS: Right IJ dual-lumen catheter tip projects over the inferior right atrium. The lungs are clear.  Heart size and vascularity are stable.  There is no pneumothorax or effusion.     No acute pulmonary process.       CBC:   Recent Labs     05/02/24  0801 05/03/24  0607   WBC 7.8 7.8   HGB 11.1* 10.8*    168     BMP:    Recent Labs     05/02/24  0801 05/03/24  0607   * 138   K 4.5 5.1   CL 96* 101   CO2 23 24   BUN 30* 17   CREATININE 4.4* 3.5*   GLUCOSE 207* 85     Hepatic:   Recent Labs     05/02/24  0801 05/03/24  0607   AST 31 23   ALT 11 10   BILITOT 0.5 0.4   ALKPHOS 105 98     Lipids:   Lab Results   Component Value Date/Time    CHOL 222 05/14/2014 09:50 AM    HDL 42 05/14/2014 09:50 AM    HDL 36 01/10/2012 10:50 AM    TRIG 159 05/14/2014 09:50 AM     Hemoglobin A1C:   Lab Results   Component Value Date/Time    LABA1C 6.0 09/07/2016 10:17 AM     TSH:   Lab Results   Component Value Date/Time    TSH 3.53 05/02/2024 09:02 AM     Troponin: No results found for: \"TROPONINT\"  Lactic Acid: No results for input(s): \"LACTA\" in the last 72 hours.  BNP: No results for input(s): \"PROBNP\" in the last 72 hours.  UA:  Lab Results   Component Value Date/Time    NITRU Negative 05/02/2024 02:14 PM    COLORU Yellow 05/02/2024 02:14 PM    PHUR 7.5 05/02/2024 02:14 PM    PHUR 6.0 09/12/2014 07:14 PM    WBCUA 12 05/02/2024 02:14 PM    RBCUA 1 05/02/2024 02:14 PM    BACTERIA 2+ 05/02/2024 02:14 PM    CLARITYU CLOUDY 05/02/2024 02:14 PM    LEUKOCYTESUR TRACE 05/02/2024 02:14 PM    UROBILINOGEN 1.0 05/02/2024 02:14 PM    BILIRUBINUR Negative 05/02/2024 02:14 PM    BLOODU Negative 05/02/2024 02:14 PM    GLUCOSEU Negative 05/02/2024 02:14 PM    KETUA Negative 05/02/2024 02:14 PM     Urine Cultures:   Lab

## 2024-05-03 NOTE — PLAN OF CARE
Problem: ABCDS Injury Assessment  Goal: Absence of physical injury  Outcome: Progressing     Problem: Discharge Planning  Goal: Discharge to home or other facility with appropriate resources  Outcome: Progressing     Problem: Safety - Adult  Goal: Free from fall injury  Outcome: Progressing     Problem: Pain  Goal: Verbalizes/displays adequate comfort level or baseline comfort level  Outcome: Progressing     Problem: Cardiovascular - Adult  Goal: Maintains optimal cardiac output and hemodynamic stability  Outcome: Progressing  Goal: Absence of cardiac dysrhythmias or at baseline  Outcome: Progressing     Problem: Metabolic/Fluid and Electrolytes - Adult  Goal: Hemodynamic stability and optimal renal function maintained  Outcome: Progressing

## 2024-05-03 NOTE — PROGRESS NOTES
[facility] - Inpatient Rehabilitation Department   Phone: [phone]    Occupational Therapy    [x] Initial Evaluation            [] Daily Treatment Note         [] Discharge Summary      Patient: Devonte Carrillo Jr.   : 3/3/1929   MRN: [id number]   Date of Service:  5/3/2024    Admitting Diagnosis:  A-fib (HCC)  Current Admission Summary: 89 y.o. Male with pertinent past medical history of ESRD on hemodialysis, abdominal aortic aneurysm, hypertension, diabetes who was brought in by EMS transportation for rapid heart rate. Patient proceeded to his routine dialysis clinic this morning, per EMS report had received less than 30 minutes of dialysis when he was noticed to be tachycardic with heart rate in the 130s. He was thus transported to the emergency department for further evaluation. Patient reports sensation of generalized weakness and fatigue ongoing for the past 3 days. Associated with some mild shortness of breath. He denies any fevers, chills, palpitations or chest pain. Patient denies any history of abnormal heart rhythms, however on record review has noted to have a history of cardiac arrhythmia and has previously been on diltiazem.   Past Medical History:  has a past medical history of AAA (abdominal aortic aneurysm) (HCC), CAD (coronary artery disease), Cataract, Diabetes mellitus (HCC), GERD (gastroesophageal reflux disease), Glaucoma, Hypercholesteremia, Hypertension, Other disorders of kidney and ureter, and Unspecified cerebral artery occlusion with cerebral infarction.  Past Surgical History:  has a past surgical history that includes hernia repair; colectomy; Revision Colostomy; Abdomen surgery; Cholecystectomy (); Cardiac electrophysiology mapping and ablation; and Cataract removal with implant (Right).    Discharge Recommendations: Devonte Carrillo Jr. scored a 19/24 on the AM-PAC ADL Inpatient form. Current research shows that an AM-PAC score of 18 or greater is typically associated

## 2024-05-03 NOTE — DISCHARGE INSTRUCTIONS
Please call and make an appointment with your PCP within 1 week  Please call and make an appointment with Cardiology  Please take all your medications as prescribed including any new ones on discharge

## 2024-05-03 NOTE — PROGRESS NOTES
PIV removed, no complications. Tele box off. This RN reviewed pt's discharge paperwork, all questions answered. Pt's family in the room and will take pt home.

## 2024-05-03 NOTE — CONSULTS
[facility]  Cardiology Note  [phone]      Chief Complaint   Patient presents with    Tachycardia     Patient brought from dialysis for high heart rate. Denies pain, reports he feels weak.        History of Present Illness:  Devonte Carrillo Jr. is a 89 y.o. patient PMHx ESRD, AAA, HTN, DM, CVAs who presented to the hospital with complaints of tachycardia    Patient went to HD session this AM and was noted to have HR in 130's for which he was sent to the ED. Patient was asymptomatic throughout. He has vague symptoms of lightheadedness and fatigue but these do not temporally relate to SVT events. They seem to occur on HD days. No CP, palpitations, orthopnea, PND    EKG in the ED with regular SVT. He had one prior SVT event surrounding his AAA repair in 2018. No strips available for review. Treated with dilt.     Past Medical History:   has a past medical history of AAA (abdominal aortic aneurysm) (HCC), CAD (coronary artery disease), Cataract, Diabetes mellitus (HCC), GERD (gastroesophageal reflux disease), Glaucoma, Hypercholesteremia, Hypertension, Other disorders of kidney and ureter, and Unspecified cerebral artery occlusion with cerebral infarction.    Surgical History:   has a past surgical history that includes hernia repair; colectomy; Revision Colostomy; Abdomen surgery; Cholecystectomy (2023); Cardiac electrophysiology mapping and ablation; and Cataract removal with implant (Right).     Social History:   reports that he quit smoking about 28 years ago. His smoking use included cigars. He has never used smokeless tobacco. He reports that he does not drink alcohol and does not use drugs.     Family History:  Family History   Problem Relation Age of Onset    Diabetes Mother     Stroke Father        Home Medications:  Were reviewed and are listed in nursing record. and/or listed below  Prior to Admission medications    Medication Sig Start Date End Date Taking? Authorizing Provider   hydrALAZINE  mouth 2 times daily (with meals)  Patient not taking: Reported on 5/2/2024 4/28/15   Deuce Comer MD   diclofenac sodium (VOLTAREN) 1 % GEL Apply 2 g topically 2 times daily.  Patient not taking: Reported on 5/2/2024 2/27/15   Deuce Comer MD   losartan (COZAAR) 100 MG tablet Take 1 tablet by mouth daily.  Patient not taking: Reported on 5/2/2024 1/5/15   Deuce Comer MD   acetaminophen (TYLENOL) 325 MG tablet Take 2 tablets by mouth every 6 hours as needed for Pain. 12/30/14   Deuce Comer MD   metFORMIN (GLUCOPHAGE) 500 MG tablet TAKE 1 TABLET BY MOUTH TWICE DAILY WITH MEALS  Patient not taking: Reported on 5/2/2024 12/16/14   Deuce Comer MD   HYDROcodone-acetaminophen (NORCO) 5-325 MG per tablet One tablet twice a day as needed for neck pain  Patient not taking: Reported on 5/2/2024 11/18/14   Deuce Comer MD   ondansetron (ZOFRAN ODT) 4 MG disintegrating tablet Take 1-2 tablets by mouth every 12 hours as needed for Nausea. May Sub regular tablet (non-ODT) if insurance does not cover ODT.  Patient not taking: Reported on 5/2/2024 9/12/14   Irvin Marcial PA-C   docusate sodium (COLACE) 100 MG capsule Take 1 capsule by mouth 2 times daily.  Patient not taking: Reported on 5/2/2024 9/12/14   Irvin Marcial PA-C   traMADol (ULTRAM) 50 MG tablet Take one tablet twice a day as needed for pain  Patient not taking: Reported on 5/2/2024 8/19/14   Deuce Comer MD   fexofenadine (ALLEGRA) 180 MG tablet Take 1 tablet by mouth daily.  Patient taking differently: Take 1 tablet by mouth daily as needed 4/2/14   Deuce Comer MD   promethazine-dextromethorphan (PROMETHAZINE-DM) 6.25-15 MG/5ML syrup TAKE 1 TEASPOONFUL BY MOUTH FOUR TIMES DAILY AS NEEDED FOR COUGH  Patient not taking: Reported on 5/2/2024 2/21/14   Deuce Comer MD   vitamin D (ERGOCALCIFEROL) 77708 UNITS CAPS capsule TAKE ONE CAPSULE BY MOUTH TWICE EVERY WEEK  Patient not taking: Reported on 5/2/2024 3/1/13   Nahomi

## 2024-05-03 NOTE — PROGRESS NOTES
associated with a discharge to the patient's home setting. Based on the patient's AM-PAC score and their current functional mobility deficits, it is recommended that the patient have 2-3 sessions per week of Physical Therapy at d/c to increase the patient's independence.  At this time, this patient demonstrates the endurance and safety to discharge home with home PT and a follow up treatment frequency of 2-3x/wk.  Please see assessment section for further patient specific details.    If patient discharges prior to next session this note will serve as a discharge summary.  Please see below for the latest assessment towards goals.      HOME HEALTH CARE: LEVEL 1 STANDARD    - Initial home health evaluation to occur within 24-48 hours, in patient home   - Therapy to evaluate with goal of regaining prior level of functioning   - Therapy to evaluate if patient has Home Health Aide needs for personal care      DME Required For Discharge: patient has all required DME for discharge  Precautions/Restrictions: high fall risk  Weight Bearing Restrictions: no restrictions  [] Right Upper Extremity  [] Left Upper Extremity [] Right Lower Extremity  [] Left Lower Extremity     Required Braces/Orthotics: no braces required (pt wears a soft cervical collar for comfort sometimes)   [] Right  [] Left  Positional Restrictions:no positional restrictions    Pre-Admission Information   Lives With: spouse                  Type of Home: house  Home Layout: two level, bedroom/bathroom upstairs  Home Access:  4-5 step to enter with handrail.  Handrails are located on B side., pt reports he enters through the garage   Bathroom Layout: tub/shower unit  Bathroom Equipment: grab bars in shower  Toilet Height: standard height  Home Equipment: rolling walker, single point cane  Transfer Assistance: Independent without use of device  Ambulation Assistance:Independent without use of device, reports use of SPC for longer distances outside of the  home  ADL Assistance: independent with all ADL's  IADL Assistance: independent with homemaking tasks  Active :        [x] Yes                 [] No  Hand Dominance: [] Left                 [x] Right  Current Employment: retired.  Occupation: [employer]  Hobbies: play cards  Recent Falls: no falls reported in the past 6 months     Examination   Vision:   Vision Corrective Device: wears glasses at all times  Hearing:   WFL  Observation:   General Observation:  pt currently wearing his soft cervical collar, IV RUE  Posture:   fair  Sensation:   reports numbness and tingling in (B) LE    ROM:   (B) LE AROM WFL; demonstrated good flexibility during ADL (able to bend fwd to reach feet and cross legs)  Strength:   (B) LE strength grossly WFL  Therapist Clinical Decision Making (Complexity): low complexity  Clinical Presentation: stable      Subjective  General: in bed at arrival, agreed to evaluation  Pain: 0/10  Pain Interventions: not applicable       Functional Mobility  Bed Mobility:  Supine to Sit: modified independent  Scooting: modified independent  Comments: HOB slightly elevated  Transfers:  Sit to stand transfer: stand by assistance, contact guard assistance  Stand to sit transfer: stand by assistance, contact guard assistance  Comments:  Ambulation:  Surface:level surface  Assistive Device: no device  Assistance: contact guard assistance  Distance: 30 ft   Gait Mechanics: mildly unsteady with med/lat sway  Comments:    Ambulation Trial 2  Surface:level surface  Assistive Device: single point cane  Assistance: stand by assistance, contact guard assistance  Distance: 60 ft  Gait Mechanics: widened RONAL, improved stability with external support, slower yolanda  Comments:      Stair Mobility:  Stair mobility not completed on this date.  Comments:  Wheelchair Mobility:  No w/c mobility completed on this date.  Comments:  Balance:  Static Sitting Balance: good: independent with functional balance in unsupported

## 2024-05-24 ENCOUNTER — TELEPHONE (OUTPATIENT)
Dept: CARDIOLOGY CLINIC | Age: 89
End: 2024-05-24

## 2024-05-24 NOTE — TELEPHONE ENCOUNTER
----- Message from Wesley Lerner MD sent at 5/23/2024  4:54 PM EDT -----  Please set him up to see me for SVT

## 2025-08-28 ENCOUNTER — APPOINTMENT (OUTPATIENT)
Dept: GENERAL RADIOLOGY | Age: 89
End: 2025-08-28
Payer: MEDICARE

## 2025-08-28 ENCOUNTER — HOSPITAL ENCOUNTER (EMERGENCY)
Age: 89
Discharge: HOME | End: 2025-08-28
Attending: EMERGENCY MEDICINE
Payer: MEDICARE

## 2025-08-28 VITALS
OXYGEN SATURATION: 100 % | SYSTOLIC BLOOD PRESSURE: 112 MMHG | HEART RATE: 60 BPM | BODY MASS INDEX: 19.95 KG/M2 | RESPIRATION RATE: 21 BRPM | DIASTOLIC BLOOD PRESSURE: 61 MMHG | TEMPERATURE: 97.7 F | HEIGHT: 64 IN | WEIGHT: 116.84 LBS

## 2025-08-28 DIAGNOSIS — Z99.2 DIALYSIS PATIENT: ICD-10-CM

## 2025-08-28 DIAGNOSIS — I95.9 HYPOTENSIVE EPISODE: Primary | ICD-10-CM

## 2025-08-28 LAB
ANION GAP SERPL CALCULATED.3IONS-SCNC: 14 MMOL/L (ref 3–16)
BASOPHILS # BLD: 0.1 K/UL (ref 0–0.2)
BASOPHILS NFR BLD: 0.5 %
BUN SERPL-MCNC: 21 MG/DL (ref 7–20)
CALCIUM SERPL-MCNC: 9.1 MG/DL (ref 8.3–10.6)
CHLORIDE SERPL-SCNC: 92 MMOL/L (ref 99–110)
CO2 SERPL-SCNC: 25 MMOL/L (ref 21–32)
CREAT SERPL-MCNC: 4.2 MG/DL (ref 0.8–1.3)
DEPRECATED RDW RBC AUTO: 18.9 % (ref 12.4–15.4)
EOSINOPHIL # BLD: 0.1 K/UL (ref 0–0.6)
EOSINOPHIL NFR BLD: 1 %
GFR SERPLBLD CREATININE-BSD FMLA CKD-EPI: 12 ML/MIN/{1.73_M2}
GLUCOSE SERPL-MCNC: 144 MG/DL (ref 70–99)
HCT VFR BLD AUTO: 27.6 % (ref 40.5–52.5)
HGB BLD-MCNC: 9.1 G/DL (ref 13.5–17.5)
LYMPHOCYTES # BLD: 1.1 K/UL (ref 1–5.1)
LYMPHOCYTES NFR BLD: 8 %
MCH RBC QN AUTO: 27.7 PG (ref 26–34)
MCHC RBC AUTO-ENTMCNC: 33.1 G/DL (ref 31–36)
MCV RBC AUTO: 83.7 FL (ref 80–100)
MONOCYTES # BLD: 1 K/UL (ref 0–1.3)
MONOCYTES NFR BLD: 7.4 %
NEUTROPHILS # BLD: 11.6 K/UL (ref 1.7–7.7)
NEUTROPHILS NFR BLD: 83.1 %
PLATELET # BLD AUTO: 407 K/UL (ref 135–450)
PMV BLD AUTO: 7 FL (ref 5–10.5)
POTASSIUM SERPL-SCNC: 4.8 MMOL/L (ref 3.5–5.1)
RBC # BLD AUTO: 3.29 M/UL (ref 4.2–5.9)
SODIUM SERPL-SCNC: 131 MMOL/L (ref 136–145)
WBC # BLD AUTO: 14 K/UL (ref 4–11)

## 2025-08-28 PROCEDURE — 85025 COMPLETE CBC W/AUTO DIFF WBC: CPT

## 2025-08-28 PROCEDURE — 71045 X-RAY EXAM CHEST 1 VIEW: CPT

## 2025-08-28 PROCEDURE — 99285 EMERGENCY DEPT VISIT HI MDM: CPT

## 2025-08-28 PROCEDURE — 80048 BASIC METABOLIC PNL TOTAL CA: CPT

## 2025-08-28 PROCEDURE — 93005 ELECTROCARDIOGRAM TRACING: CPT | Performed by: NURSE PRACTITIONER

## 2025-08-28 PROCEDURE — 36415 COLL VENOUS BLD VENIPUNCTURE: CPT

## 2025-08-28 ASSESSMENT — LIFESTYLE VARIABLES
HOW OFTEN DO YOU HAVE A DRINK CONTAINING ALCOHOL: NEVER
HOW MANY STANDARD DRINKS CONTAINING ALCOHOL DO YOU HAVE ON A TYPICAL DAY: PATIENT DOES NOT DRINK

## 2025-08-28 ASSESSMENT — PAIN SCALES - GENERAL: PAINLEVEL_OUTOF10: 0

## 2025-08-28 ASSESSMENT — PAIN - FUNCTIONAL ASSESSMENT: PAIN_FUNCTIONAL_ASSESSMENT: 0-10

## 2025-08-28 ASSESSMENT — ENCOUNTER SYMPTOMS
ABDOMINAL PAIN: 0
CHEST TIGHTNESS: 0
DIARRHEA: 0
SHORTNESS OF BREATH: 0
VOMITING: 0
NAUSEA: 0

## 2025-08-29 LAB
EKG ATRIAL RATE: 60 BPM
EKG DIAGNOSIS: NORMAL
EKG P AXIS: 22 DEGREES
EKG P-R INTERVAL: 146 MS
EKG Q-T INTERVAL: 450 MS
EKG QRS DURATION: 82 MS
EKG QTC CALCULATION (BAZETT): 450 MS
EKG R AXIS: -32 DEGREES
EKG T AXIS: 80 DEGREES
EKG VENTRICULAR RATE: 60 BPM

## 2025-08-29 PROCEDURE — 93010 ELECTROCARDIOGRAM REPORT: CPT | Performed by: INTERNAL MEDICINE

## 2025-08-31 ENCOUNTER — HOSPITAL ENCOUNTER (INPATIENT)
Age: 89
LOS: 6 days | End: 2025-09-06
Attending: EMERGENCY MEDICINE
Payer: MEDICARE

## 2025-08-31 ENCOUNTER — APPOINTMENT (OUTPATIENT)
Dept: CT IMAGING | Age: 89
End: 2025-08-31
Payer: MEDICARE

## 2025-08-31 ENCOUNTER — APPOINTMENT (OUTPATIENT)
Dept: GENERAL RADIOLOGY | Age: 89
End: 2025-08-31
Payer: MEDICARE

## 2025-08-31 DIAGNOSIS — I46.9 CARDIAC ARREST (HCC): ICD-10-CM

## 2025-08-31 DIAGNOSIS — I26.02 ACUTE SADDLE PULMONARY EMBOLISM WITH ACUTE COR PULMONALE (HCC): Primary | ICD-10-CM

## 2025-08-31 LAB
ALBUMIN SERPL-MCNC: 2.2 G/DL (ref 3.4–5)
ALBUMIN/GLOB SERPL: 0.5 {RATIO} (ref 1.1–2.2)
ALP SERPL-CCNC: 491 U/L (ref 40–129)
ALT SERPL-CCNC: 18 U/L (ref 10–40)
ALT SERPL-CCNC: ABNORMAL U/L (ref 10–40)
ANION GAP SERPL CALCULATED.3IONS-SCNC: 17 MMOL/L (ref 3–16)
ANTI-XA UNFRAC HEPARIN: <0.1 IU/ML (ref 0.3–0.7)
APTT BLD: 42.6 SEC (ref 22.8–35.8)
AST SERPL-CCNC: 80 U/L (ref 15–37)
BASE EXCESS BLDV CALC-SCNC: -5.4 MMOL/L (ref -3–3)
BASOPHILS # BLD: 0 K/UL (ref 0–0.2)
BASOPHILS NFR BLD: 0.2 %
BILIRUB SERPL-MCNC: 0.5 MG/DL (ref 0–1)
BUN SERPL-MCNC: 23 MG/DL (ref 7–20)
CALCIUM SERPL-MCNC: 8.8 MG/DL (ref 8.3–10.6)
CHLORIDE SERPL-SCNC: 93 MMOL/L (ref 99–110)
CO2 BLDV-SCNC: 40 MMOL/L
CO2 SERPL-SCNC: 19 MMOL/L (ref 21–32)
COHGB MFR BLDV: 6.8 % (ref 0–1.5)
CREAT SERPL-MCNC: 4.5 MG/DL (ref 0.8–1.3)
DEPRECATED RDW RBC AUTO: 19.2 % (ref 12.4–15.4)
DEPRECATED RDW RBC AUTO: 20.3 % (ref 12.4–15.4)
EOSINOPHIL # BLD: 0.1 K/UL (ref 0–0.6)
EOSINOPHIL NFR BLD: 0.5 %
GFR SERPLBLD CREATININE-BSD FMLA CKD-EPI: 11 ML/MIN/{1.73_M2}
GLUCOSE BLD-MCNC: 123 MG/DL (ref 70–99)
GLUCOSE SERPL-MCNC: 120 MG/DL (ref 70–99)
HCO3 BLDV-SCNC: 17.3 MMOL/L (ref 23–29)
HCT VFR BLD AUTO: 26 % (ref 40.5–52.5)
HCT VFR BLD AUTO: 27.9 % (ref 40.5–52.5)
HGB BLD-MCNC: 8.3 G/DL (ref 13.5–17.5)
HGB BLD-MCNC: 9.1 G/DL (ref 13.5–17.5)
INR PPP: 1.51 (ref 0.86–1.14)
LACTATE BLDV-SCNC: 1.7 MMOL/L (ref 0.4–2)
LACTATE BLDV-SCNC: 3.6 MMOL/L (ref 0.4–2)
LYMPHOCYTES # BLD: 3.8 K/UL (ref 1–5.1)
LYMPHOCYTES NFR BLD: 26.3 %
MCH RBC QN AUTO: 27 PG (ref 26–34)
MCH RBC QN AUTO: 27.2 PG (ref 26–34)
MCHC RBC AUTO-ENTMCNC: 31.8 G/DL (ref 31–36)
MCHC RBC AUTO-ENTMCNC: 32.5 G/DL (ref 31–36)
MCV RBC AUTO: 83 FL (ref 80–100)
MCV RBC AUTO: 85.5 FL (ref 80–100)
METHGB MFR BLDV: 0.6 %
MONOCYTES # BLD: 1.2 K/UL (ref 0–1.3)
MONOCYTES NFR BLD: 8.1 %
NEUTROPHILS # BLD: 9.3 K/UL (ref 1.7–7.7)
NEUTROPHILS NFR BLD: 64.9 %
O2 CT VFR BLDV CALC: 12 VOL %
O2 THERAPY: ABNORMAL
PCO2 BLDV: 24.1 MMHG (ref 40–50)
PERFORMED ON: ABNORMAL
PH BLDV: 7.46 [PH] (ref 7.35–7.45)
PLATELET # BLD AUTO: 310 K/UL (ref 135–450)
PLATELET # BLD AUTO: 353 K/UL (ref 135–450)
PMV BLD AUTO: 6.7 FL (ref 5–10.5)
PMV BLD AUTO: 6.8 FL (ref 5–10.5)
PO2 BLDV: 124 MMHG (ref 25–40)
POTASSIUM SERPL-SCNC: 4.1 MMOL/L (ref 3.5–5.1)
POTASSIUM SERPL-SCNC: ABNORMAL MMOL/L (ref 3.5–5.1)
PROT SERPL-MCNC: 6.5 G/DL (ref 6.4–8.2)
PROTHROMBIN TIME: 18.3 SEC (ref 12.1–14.9)
RBC # BLD AUTO: 3.04 M/UL (ref 4.2–5.9)
RBC # BLD AUTO: 3.37 M/UL (ref 4.2–5.9)
REASON FOR REJECTION: NORMAL
REJECTED TEST: NORMAL
SAO2 % BLDV: 100 %
SODIUM SERPL-SCNC: 129 MMOL/L (ref 136–145)
TROPONIN, HIGH SENSITIVITY: 141 NG/L (ref 0–22)
TROPONIN, HIGH SENSITIVITY: 146 NG/L (ref 0–22)
WBC # BLD AUTO: 11.5 K/UL (ref 4–11)
WBC # BLD AUTO: 14.4 K/UL (ref 4–11)

## 2025-08-31 PROCEDURE — 82803 BLOOD GASES ANY COMBINATION: CPT

## 2025-08-31 PROCEDURE — 80053 COMPREHEN METABOLIC PANEL: CPT

## 2025-08-31 PROCEDURE — 85610 PROTHROMBIN TIME: CPT

## 2025-08-31 PROCEDURE — 85025 COMPLETE CBC W/AUTO DIFF WBC: CPT

## 2025-08-31 PROCEDURE — 96365 THER/PROPH/DIAG IV INF INIT: CPT

## 2025-08-31 PROCEDURE — 99291 CRITICAL CARE FIRST HOUR: CPT

## 2025-08-31 PROCEDURE — 71260 CT THORAX DX C+: CPT

## 2025-08-31 PROCEDURE — 31500 INSERT EMERGENCY AIRWAY: CPT

## 2025-08-31 PROCEDURE — 85027 COMPLETE CBC AUTOMATED: CPT

## 2025-08-31 PROCEDURE — 93005 ELECTROCARDIOGRAM TRACING: CPT | Performed by: EMERGENCY MEDICINE

## 2025-08-31 PROCEDURE — 87150 DNA/RNA AMPLIFIED PROBE: CPT

## 2025-08-31 PROCEDURE — 85520 HEPARIN ASSAY: CPT

## 2025-08-31 PROCEDURE — 6360000002 HC RX W HCPCS: Performed by: EMERGENCY MEDICINE

## 2025-08-31 PROCEDURE — 94002 VENT MGMT INPAT INIT DAY: CPT

## 2025-08-31 PROCEDURE — 2500000003 HC RX 250 WO HCPCS

## 2025-08-31 PROCEDURE — 96368 THER/DIAG CONCURRENT INF: CPT

## 2025-08-31 PROCEDURE — 87040 BLOOD CULTURE FOR BACTERIA: CPT

## 2025-08-31 PROCEDURE — 2000000000 HC ICU R&B

## 2025-08-31 PROCEDURE — 70450 CT HEAD/BRAIN W/O DYE: CPT

## 2025-08-31 PROCEDURE — 83605 ASSAY OF LACTIC ACID: CPT

## 2025-08-31 PROCEDURE — 36556 INSERT NON-TUNNEL CV CATH: CPT

## 2025-08-31 PROCEDURE — 84145 PROCALCITONIN (PCT): CPT

## 2025-08-31 PROCEDURE — 84484 ASSAY OF TROPONIN QUANT: CPT

## 2025-08-31 PROCEDURE — 2580000003 HC RX 258: Performed by: EMERGENCY MEDICINE

## 2025-08-31 PROCEDURE — 96375 TX/PRO/DX INJ NEW DRUG ADDON: CPT

## 2025-08-31 PROCEDURE — 71045 X-RAY EXAM CHEST 1 VIEW: CPT

## 2025-08-31 PROCEDURE — 6360000004 HC RX CONTRAST MEDICATION: Performed by: EMERGENCY MEDICINE

## 2025-08-31 PROCEDURE — 85730 THROMBOPLASTIN TIME PARTIAL: CPT

## 2025-08-31 PROCEDURE — 87186 SC STD MICRODIL/AGAR DIL: CPT

## 2025-08-31 PROCEDURE — 96366 THER/PROPH/DIAG IV INF ADDON: CPT

## 2025-08-31 PROCEDURE — 99285 EMERGENCY DEPT VISIT HI MDM: CPT

## 2025-08-31 PROCEDURE — 94761 N-INVAS EAR/PLS OXIMETRY MLT: CPT

## 2025-08-31 PROCEDURE — 2700000000 HC OXYGEN THERAPY PER DAY

## 2025-08-31 PROCEDURE — 74177 CT ABD & PELVIS W/CONTRAST: CPT

## 2025-08-31 RX ORDER — HEPARIN SODIUM 1000 [USP'U]/ML
80 INJECTION, SOLUTION INTRAVENOUS; SUBCUTANEOUS PRN
Status: DISCONTINUED | OUTPATIENT
Start: 2025-08-31 | End: 2025-09-07 | Stop reason: HOSPADM

## 2025-08-31 RX ORDER — NALOXONE HYDROCHLORIDE 1 MG/ML
INJECTION INTRAMUSCULAR; INTRAVENOUS; SUBCUTANEOUS
Status: DISPENSED
Start: 2025-08-31 | End: 2025-09-01

## 2025-08-31 RX ORDER — FENTANYL CITRATE 50 UG/ML
50 INJECTION, SOLUTION INTRAMUSCULAR; INTRAVENOUS PRN
Status: COMPLETED | OUTPATIENT
Start: 2025-08-31 | End: 2025-08-31

## 2025-08-31 RX ORDER — NALOXONE HYDROCHLORIDE 1 MG/ML
0.4 INJECTION INTRAMUSCULAR; INTRAVENOUS; SUBCUTANEOUS ONCE
Status: COMPLETED | OUTPATIENT
Start: 2025-08-31 | End: 2025-08-31

## 2025-08-31 RX ORDER — NOREPINEPHRINE BITARTRATE 0.03 MG/ML
1-100 INJECTION, SOLUTION INTRAVENOUS CONTINUOUS
Status: DISCONTINUED | OUTPATIENT
Start: 2025-08-31 | End: 2025-09-06

## 2025-08-31 RX ORDER — SODIUM CHLORIDE 0.9 % (FLUSH) 0.9 %
5-40 SYRINGE (ML) INJECTION EVERY 12 HOURS SCHEDULED
Status: DISCONTINUED | OUTPATIENT
Start: 2025-08-31 | End: 2025-09-07 | Stop reason: HOSPADM

## 2025-08-31 RX ORDER — NOREPINEPHRINE BITARTRATE 0.03 MG/ML
INJECTION, SOLUTION INTRAVENOUS
Status: COMPLETED
Start: 2025-08-31 | End: 2025-08-31

## 2025-08-31 RX ORDER — CARVEDILOL 25 MG/1
25 TABLET ORAL 2 TIMES DAILY
COMMUNITY

## 2025-08-31 RX ORDER — FAMOTIDINE 20 MG/1
20 TABLET, FILM COATED ORAL DAILY
COMMUNITY

## 2025-08-31 RX ORDER — ACETAMINOPHEN 650 MG/1
650 SUPPOSITORY RECTAL EVERY 6 HOURS PRN
Status: DISCONTINUED | OUTPATIENT
Start: 2025-08-31 | End: 2025-09-07 | Stop reason: HOSPADM

## 2025-08-31 RX ORDER — HEPARIN SODIUM 1000 [USP'U]/ML
80 INJECTION, SOLUTION INTRAVENOUS; SUBCUTANEOUS ONCE
Status: COMPLETED | OUTPATIENT
Start: 2025-08-31 | End: 2025-08-31

## 2025-08-31 RX ORDER — ROSUVASTATIN CALCIUM 10 MG/1
10 TABLET, COATED ORAL NIGHTLY
COMMUNITY

## 2025-08-31 RX ORDER — ONDANSETRON 2 MG/ML
4 INJECTION INTRAMUSCULAR; INTRAVENOUS EVERY 6 HOURS PRN
Status: DISCONTINUED | OUTPATIENT
Start: 2025-08-31 | End: 2025-09-07 | Stop reason: HOSPADM

## 2025-08-31 RX ORDER — SODIUM CHLORIDE 0.9 % (FLUSH) 0.9 %
5-40 SYRINGE (ML) INJECTION PRN
Status: DISCONTINUED | OUTPATIENT
Start: 2025-08-31 | End: 2025-09-07 | Stop reason: HOSPADM

## 2025-08-31 RX ORDER — FENTANYL CITRATE-0.9 % NACL/PF 10 MCG/ML
25-200 PLASTIC BAG, INJECTION (ML) INTRAVENOUS CONTINUOUS
Refills: 0 | Status: DISCONTINUED | OUTPATIENT
Start: 2025-08-31 | End: 2025-09-06

## 2025-08-31 RX ORDER — AMLODIPINE BESYLATE 5 MG/1
5 TABLET ORAL DAILY
COMMUNITY

## 2025-08-31 RX ORDER — LOSARTAN POTASSIUM 100 MG/1
100 TABLET ORAL DAILY
COMMUNITY

## 2025-08-31 RX ORDER — PANTOPRAZOLE SODIUM 40 MG/1
40 TABLET, DELAYED RELEASE ORAL DAILY
COMMUNITY

## 2025-08-31 RX ORDER — POLYETHYLENE GLYCOL 3350 17 G/17G
17 POWDER, FOR SOLUTION ORAL DAILY PRN
Status: DISCONTINUED | OUTPATIENT
Start: 2025-08-31 | End: 2025-09-07 | Stop reason: HOSPADM

## 2025-08-31 RX ORDER — HEPARIN SODIUM 10000 [USP'U]/100ML
5-30 INJECTION, SOLUTION INTRAVENOUS CONTINUOUS
Status: DISCONTINUED | OUTPATIENT
Start: 2025-08-31 | End: 2025-09-07 | Stop reason: HOSPADM

## 2025-08-31 RX ORDER — TORSEMIDE 20 MG/1
40 TABLET ORAL DAILY
COMMUNITY

## 2025-08-31 RX ORDER — 0.9 % SODIUM CHLORIDE 0.9 %
1000 INTRAVENOUS SOLUTION INTRAVENOUS ONCE
Status: COMPLETED | OUTPATIENT
Start: 2025-08-31 | End: 2025-09-01

## 2025-08-31 RX ORDER — ONDANSETRON 4 MG/1
4 TABLET, ORALLY DISINTEGRATING ORAL EVERY 8 HOURS PRN
Status: DISCONTINUED | OUTPATIENT
Start: 2025-08-31 | End: 2025-09-07 | Stop reason: HOSPADM

## 2025-08-31 RX ORDER — OXYCODONE HYDROCHLORIDE 5 MG/1
5 TABLET ORAL 2 TIMES DAILY PRN
COMMUNITY

## 2025-08-31 RX ORDER — HEPARIN SODIUM 1000 [USP'U]/ML
40 INJECTION, SOLUTION INTRAVENOUS; SUBCUTANEOUS PRN
Status: DISCONTINUED | OUTPATIENT
Start: 2025-08-31 | End: 2025-09-07 | Stop reason: HOSPADM

## 2025-08-31 RX ORDER — SODIUM CHLORIDE 9 MG/ML
INJECTION, SOLUTION INTRAVENOUS PRN
Status: DISCONTINUED | OUTPATIENT
Start: 2025-08-31 | End: 2025-09-07 | Stop reason: HOSPADM

## 2025-08-31 RX ORDER — ACETAMINOPHEN 325 MG/1
650 TABLET ORAL EVERY 6 HOURS PRN
Status: DISCONTINUED | OUTPATIENT
Start: 2025-08-31 | End: 2025-09-07 | Stop reason: HOSPADM

## 2025-08-31 RX ADMIN — IOHEXOL 75 ML: 350 INJECTION, SOLUTION INTRAVENOUS at 19:59

## 2025-08-31 RX ADMIN — HEPARIN SODIUM 18 UNITS/KG/HR: 10000 INJECTION, SOLUTION INTRAVENOUS at 21:57

## 2025-08-31 RX ADMIN — FENTANYL CITRATE 50 MCG: 50 INJECTION, SOLUTION INTRAMUSCULAR; INTRAVENOUS at 20:18

## 2025-08-31 RX ADMIN — NALOXONE HYDROCHLORIDE 0.4 MG: 1 INJECTION PARENTERAL at 19:00

## 2025-08-31 RX ADMIN — Medication 50 MCG/HR: at 20:27

## 2025-08-31 RX ADMIN — HEPARIN SODIUM 4200 UNITS: 1000 INJECTION, SOLUTION INTRAVENOUS; SUBCUTANEOUS at 21:54

## 2025-08-31 RX ADMIN — SODIUM CHLORIDE 1000 ML: 0.9 INJECTION, SOLUTION INTRAVENOUS at 21:20

## 2025-08-31 RX ADMIN — SODIUM CHLORIDE 1250 MG: 0.9 INJECTION, SOLUTION INTRAVENOUS at 21:52

## 2025-08-31 RX ADMIN — CEFEPIME 1000 MG: 1 INJECTION, POWDER, FOR SOLUTION INTRAMUSCULAR; INTRAVENOUS at 21:16

## 2025-08-31 RX ADMIN — NOREPINEPHRINE BITARTRATE 15 MCG/MIN: 32 SOLUTION INTRAVENOUS at 19:00

## 2025-08-31 RX ADMIN — Medication 15 MCG/MIN: at 19:00

## 2025-08-31 ASSESSMENT — PULMONARY FUNCTION TESTS
PIF_VALUE: 13
PIF_VALUE: 17
PIF_VALUE: 22

## 2025-09-01 ENCOUNTER — APPOINTMENT (OUTPATIENT)
Dept: GENERAL RADIOLOGY | Age: 89
End: 2025-09-01
Payer: MEDICARE

## 2025-09-01 PROBLEM — Z99.2 ESRD ON DIALYSIS (HCC): Status: ACTIVE | Noted: 2025-09-01

## 2025-09-01 PROBLEM — Z97.8 ENDOTRACHEALLY INTUBATED: Status: ACTIVE | Noted: 2025-09-01

## 2025-09-01 PROBLEM — C34.90 PRIMARY MALIGNANT NEOPLASM OF LUNG METASTATIC TO OTHER SITE (HCC): Status: ACTIVE | Noted: 2025-09-01

## 2025-09-01 PROBLEM — N18.6 ESRD ON DIALYSIS (HCC): Status: ACTIVE | Noted: 2025-09-01

## 2025-09-01 PROBLEM — R74.01 TRANSAMINITIS: Status: ACTIVE | Noted: 2025-09-01

## 2025-09-01 PROBLEM — R65.21 SEPTIC SHOCK (HCC): Status: ACTIVE | Noted: 2025-09-01

## 2025-09-01 PROBLEM — C22.8 LIVER CANCER, PRIMARY, WITH METASTASIS FROM LIVER TO OTHER SITE (HCC): Status: ACTIVE | Noted: 2025-09-01

## 2025-09-01 PROBLEM — C78.7 MALIGNANT NEOPLASM METASTATIC TO LIVER (HCC): Status: ACTIVE | Noted: 2025-09-01

## 2025-09-01 PROBLEM — C22.0 CANCER, HEPATOCELLULAR (HCC): Status: ACTIVE | Noted: 2025-09-01

## 2025-09-01 PROBLEM — A41.9 SEPTIC SHOCK (HCC): Status: ACTIVE | Noted: 2025-09-01

## 2025-09-01 PROBLEM — I26.99 PULMONARY EMBOLI (HCC): Status: ACTIVE | Noted: 2025-09-01

## 2025-09-01 PROBLEM — R78.81 GRAM-NEGATIVE BACTEREMIA: Status: ACTIVE | Noted: 2025-09-01

## 2025-09-01 PROBLEM — J96.01 ACUTE RESPIRATORY FAILURE WITH HYPOXIA (HCC): Status: ACTIVE | Noted: 2025-09-01

## 2025-09-01 PROBLEM — Z99.11 ON MECHANICALLY ASSISTED VENTILATION (HCC): Status: ACTIVE | Noted: 2025-09-01

## 2025-09-01 LAB
ALBUMIN SERPL-MCNC: 2.7 G/DL (ref 3.4–5)
ALBUMIN SERPL-MCNC: 3 G/DL (ref 3.4–5)
ALP SERPL-CCNC: 437 U/L (ref 40–129)
ALT SERPL-CCNC: 23 U/L (ref 10–40)
ANION GAP SERPL CALCULATED.3IONS-SCNC: 18 MMOL/L (ref 3–16)
ANION GAP SERPL CALCULATED.3IONS-SCNC: 20 MMOL/L (ref 3–16)
ANTI-XA UNFRAC HEPARIN: 0.55 IU/ML (ref 0.3–0.7)
ANTI-XA UNFRAC HEPARIN: 0.6 IU/ML (ref 0.3–0.7)
AST SERPL-CCNC: 88 U/L (ref 15–37)
BACTERIA URNS QL MICRO: ABNORMAL /HPF
BASE EXCESS BLDA CALC-SCNC: -6.5 MMOL/L (ref -3–3)
BASE EXCESS BLDA CALC-SCNC: -8.8 MMOL/L (ref -3–3)
BASOPHILS # BLD: 0 K/UL (ref 0–0.2)
BASOPHILS NFR BLD: 0.3 %
BILIRUB DIRECT SERPL-MCNC: 0.3 MG/DL (ref 0–0.3)
BILIRUB INDIRECT SERPL-MCNC: 0.3 MG/DL (ref 0–1)
BILIRUB SERPL-MCNC: 0.6 MG/DL (ref 0–1)
BILIRUB UR QL STRIP.AUTO: NEGATIVE
BUN SERPL-MCNC: 28 MG/DL (ref 7–20)
BUN SERPL-MCNC: 28 MG/DL (ref 7–20)
CA-I BLD-SCNC: 1.05 MMOL/L (ref 1.12–1.32)
CALCIUM SERPL-MCNC: 8.1 MG/DL (ref 8.3–10.6)
CALCIUM SERPL-MCNC: 8.5 MG/DL (ref 8.3–10.6)
CHARACTER UR: ABNORMAL
CHLORIDE SERPL-SCNC: 95 MMOL/L (ref 99–110)
CHLORIDE SERPL-SCNC: 96 MMOL/L (ref 99–110)
CLARITY UR: ABNORMAL
CO2 BLDA-SCNC: 39.1 MMOL/L
CO2 BLDA-SCNC: 42.1 MMOL/L
CO2 SERPL-SCNC: 16 MMOL/L (ref 21–32)
CO2 SERPL-SCNC: 17 MMOL/L (ref 21–32)
COHGB MFR BLDA: 0.7 % (ref 0–1.5)
COHGB MFR BLDA: 1.6 % (ref 0–1.5)
COLOR UR: YELLOW
CREAT SERPL-MCNC: 4.5 MG/DL (ref 0.8–1.3)
CREAT SERPL-MCNC: 4.6 MG/DL (ref 0.8–1.3)
DEPRECATED RDW RBC AUTO: 20 % (ref 12.4–15.4)
EKG ATRIAL RATE: 65 BPM
EKG DIAGNOSIS: NORMAL
EKG P AXIS: 61 DEGREES
EKG P-R INTERVAL: 176 MS
EKG Q-T INTERVAL: 498 MS
EKG QRS DURATION: 130 MS
EKG QTC CALCULATION (BAZETT): 517 MS
EKG R AXIS: -83 DEGREES
EKG T AXIS: 39 DEGREES
EKG VENTRICULAR RATE: 65 BPM
EOSINOPHIL # BLD: 0 K/UL (ref 0–0.6)
EOSINOPHIL NFR BLD: 0 %
EPI CELLS #/AREA URNS HPF: ABNORMAL /HPF (ref 0–5)
GFR SERPLBLD CREATININE-BSD FMLA CKD-EPI: 11 ML/MIN/{1.73_M2}
GFR SERPLBLD CREATININE-BSD FMLA CKD-EPI: 11 ML/MIN/{1.73_M2}
GLUCOSE BLD-MCNC: 109 MG/DL (ref 70–99)
GLUCOSE BLD-MCNC: 115 MG/DL (ref 70–99)
GLUCOSE BLD-MCNC: 118 MG/DL (ref 70–99)
GLUCOSE SERPL-MCNC: 134 MG/DL (ref 70–99)
GLUCOSE SERPL-MCNC: 144 MG/DL (ref 70–99)
GLUCOSE UR STRIP.AUTO-MCNC: NEGATIVE MG/DL
HCO3 BLDA-SCNC: 16.5 MMOL/L (ref 21–29)
HCO3 BLDA-SCNC: 17.9 MMOL/L (ref 21–29)
HCT VFR BLD AUTO: 26.4 % (ref 40.5–52.5)
HGB BLD-MCNC: 8.4 G/DL (ref 13.5–17.5)
HGB BLDA-MCNC: 8.8 G/DL (ref 13.5–17.5)
HGB BLDA-MCNC: 9.1 G/DL (ref 13.5–17.5)
HGB UR QL STRIP.AUTO: ABNORMAL
KETONES UR STRIP.AUTO-MCNC: ABNORMAL MG/DL
LACTATE BLDV-SCNC: 1.4 MMOL/L (ref 0.4–1.9)
LEUKOCYTE ESTERASE UR QL STRIP.AUTO: ABNORMAL
LYMPHOCYTES # BLD: 0.8 K/UL (ref 1–5.1)
LYMPHOCYTES NFR BLD: 6.3 %
MAGNESIUM SERPL-MCNC: 1.91 MG/DL (ref 1.8–2.4)
MCH RBC QN AUTO: 26.9 PG (ref 26–34)
MCHC RBC AUTO-ENTMCNC: 31.8 G/DL (ref 31–36)
MCV RBC AUTO: 84.5 FL (ref 80–100)
METHGB MFR BLDA: 0.5 %
METHGB MFR BLDA: 0.9 %
MONOCYTES # BLD: 0.8 K/UL (ref 0–1.3)
MONOCYTES NFR BLD: 6.5 %
NEUTROPHILS # BLD: 11.1 K/UL (ref 1.7–7.7)
NEUTROPHILS NFR BLD: 86.9 %
NITRITE UR QL STRIP.AUTO: NEGATIVE
O2 THERAPY: ABNORMAL
O2 THERAPY: ABNORMAL
PCO2 BLDA: 30.6 MMHG (ref 35–45)
PCO2 BLDA: 32.2 MMHG (ref 35–45)
PERFORMED ON: ABNORMAL
PH BLDA: 7.32 [PH] (ref 7.35–7.45)
PH BLDA: 7.37 [PH] (ref 7.35–7.45)
PH BLDV: 7.34 [PH] (ref 7.35–7.45)
PH UR STRIP.AUTO: 8.5 [PH] (ref 5–8)
PHOSPHATE SERPL-MCNC: 5.3 MG/DL (ref 2.5–4.9)
PLATELET # BLD AUTO: 316 K/UL (ref 135–450)
PMV BLD AUTO: 6.6 FL (ref 5–10.5)
PO2 BLDA: 376 MMHG (ref 75–108)
PO2 BLDA: 91.8 MMHG (ref 75–108)
POTASSIUM SERPL-SCNC: 4.6 MMOL/L (ref 3.5–5.1)
POTASSIUM SERPL-SCNC: 4.7 MMOL/L (ref 3.5–5.1)
PROCALCITONIN SERPL IA-MCNC: 4.34 NG/ML (ref 0–0.15)
PROT SERPL-MCNC: 7.2 G/DL (ref 6.4–8.2)
PROT UR STRIP.AUTO-MCNC: >=300 MG/DL
RBC # BLD AUTO: 3.12 M/UL (ref 4.2–5.9)
RBC #/AREA URNS HPF: ABNORMAL /HPF (ref 0–4)
REPORT: NORMAL
SAO2 % BLDA: 100 %
SAO2 % BLDA: 96.9 %
SODIUM SERPL-SCNC: 130 MMOL/L (ref 136–145)
SODIUM SERPL-SCNC: 132 MMOL/L (ref 136–145)
SP GR UR STRIP.AUTO: 1.02 (ref 1–1.03)
UA COMPLETE W REFLEX CULTURE PNL UR: YES
UA DIPSTICK W REFLEX MICRO PNL UR: YES
URN SPEC COLLECT METH UR: ABNORMAL
UROBILINOGEN UR STRIP-ACNC: 0.2 E.U./DL
VANCOMYCIN SERPL-MCNC: 16.1 UG/ML
WBC # BLD AUTO: 12.8 K/UL (ref 4–11)
WBC #/AREA URNS HPF: ABNORMAL /HPF (ref 0–5)

## 2025-09-01 PROCEDURE — 82330 ASSAY OF CALCIUM: CPT

## 2025-09-01 PROCEDURE — 99291 CRITICAL CARE FIRST HOUR: CPT | Performed by: INTERNAL MEDICINE

## 2025-09-01 PROCEDURE — 99223 1ST HOSP IP/OBS HIGH 75: CPT | Performed by: INTERNAL MEDICINE

## 2025-09-01 PROCEDURE — 94003 VENT MGMT INPAT SUBQ DAY: CPT

## 2025-09-01 PROCEDURE — 80202 ASSAY OF VANCOMYCIN: CPT

## 2025-09-01 PROCEDURE — 2580000003 HC RX 258: Performed by: HOSPITALIST

## 2025-09-01 PROCEDURE — 94640 AIRWAY INHALATION TREATMENT: CPT

## 2025-09-01 PROCEDURE — 93010 ELECTROCARDIOGRAM REPORT: CPT | Performed by: INTERNAL MEDICINE

## 2025-09-01 PROCEDURE — 2580000003 HC RX 258: Performed by: INTERNAL MEDICINE

## 2025-09-01 PROCEDURE — 2000000000 HC ICU R&B

## 2025-09-01 PROCEDURE — 85025 COMPLETE CBC W/AUTO DIFF WBC: CPT

## 2025-09-01 PROCEDURE — 83605 ASSAY OF LACTIC ACID: CPT

## 2025-09-01 PROCEDURE — 6360000002 HC RX W HCPCS: Performed by: INTERNAL MEDICINE

## 2025-09-01 PROCEDURE — 87086 URINE CULTURE/COLONY COUNT: CPT

## 2025-09-01 PROCEDURE — 83735 ASSAY OF MAGNESIUM: CPT

## 2025-09-01 PROCEDURE — 6370000000 HC RX 637 (ALT 250 FOR IP): Performed by: STUDENT IN AN ORGANIZED HEALTH CARE EDUCATION/TRAINING PROGRAM

## 2025-09-01 PROCEDURE — 85520 HEPARIN ASSAY: CPT

## 2025-09-01 PROCEDURE — 2500000003 HC RX 250 WO HCPCS: Performed by: EMERGENCY MEDICINE

## 2025-09-01 PROCEDURE — 80076 HEPATIC FUNCTION PANEL: CPT

## 2025-09-01 PROCEDURE — 71045 X-RAY EXAM CHEST 1 VIEW: CPT

## 2025-09-01 PROCEDURE — 82803 BLOOD GASES ANY COMBINATION: CPT

## 2025-09-01 PROCEDURE — 6360000002 HC RX W HCPCS

## 2025-09-01 PROCEDURE — P9045 ALBUMIN (HUMAN), 5%, 250 ML: HCPCS

## 2025-09-01 PROCEDURE — 2500000003 HC RX 250 WO HCPCS

## 2025-09-01 PROCEDURE — 81001 URINALYSIS AUTO W/SCOPE: CPT

## 2025-09-01 PROCEDURE — 2580000003 HC RX 258: Performed by: STUDENT IN AN ORGANIZED HEALTH CARE EDUCATION/TRAINING PROGRAM

## 2025-09-01 PROCEDURE — 2700000000 HC OXYGEN THERAPY PER DAY

## 2025-09-01 PROCEDURE — 6360000002 HC RX W HCPCS: Performed by: STUDENT IN AN ORGANIZED HEALTH CARE EDUCATION/TRAINING PROGRAM

## 2025-09-01 PROCEDURE — 80069 RENAL FUNCTION PANEL: CPT

## 2025-09-01 RX ORDER — MAGNESIUM SULFATE 1 G/100ML
1000 INJECTION INTRAVENOUS PRN
Status: DISCONTINUED | OUTPATIENT
Start: 2025-09-01 | End: 2025-09-05

## 2025-09-01 RX ORDER — HEPARIN SODIUM 1000 [USP'U]/ML
INJECTION, SOLUTION INTRAVENOUS; SUBCUTANEOUS PRN
Status: DISCONTINUED | OUTPATIENT
Start: 2025-09-01 | End: 2025-09-05

## 2025-09-01 RX ORDER — TIMOLOL MALEATE 5 MG/ML
1 SOLUTION/ DROPS OPHTHALMIC 2 TIMES DAILY
Status: DISCONTINUED | OUTPATIENT
Start: 2025-09-01 | End: 2025-09-07 | Stop reason: HOSPADM

## 2025-09-01 RX ORDER — PANTOPRAZOLE SODIUM 40 MG/10ML
40 INJECTION, POWDER, LYOPHILIZED, FOR SOLUTION INTRAVENOUS DAILY
Status: DISCONTINUED | OUTPATIENT
Start: 2025-09-01 | End: 2025-09-07 | Stop reason: HOSPADM

## 2025-09-01 RX ORDER — CARVEDILOL 25 MG/1
25 TABLET ORAL 2 TIMES DAILY
Status: DISCONTINUED | OUTPATIENT
Start: 2025-09-01 | End: 2025-09-07 | Stop reason: HOSPADM

## 2025-09-01 RX ORDER — POTASSIUM CHLORIDE 29.8 MG/ML
20 INJECTION INTRAVENOUS PRN
Status: DISCONTINUED | OUTPATIENT
Start: 2025-09-01 | End: 2025-09-05

## 2025-09-01 RX ORDER — AMLODIPINE BESYLATE 5 MG/1
5 TABLET ORAL DAILY
Status: DISCONTINUED | OUTPATIENT
Start: 2025-09-01 | End: 2025-09-07 | Stop reason: HOSPADM

## 2025-09-01 RX ORDER — LOSARTAN POTASSIUM 100 MG/1
100 TABLET ORAL DAILY
Status: DISCONTINUED | OUTPATIENT
Start: 2025-09-01 | End: 2025-09-07 | Stop reason: HOSPADM

## 2025-09-01 RX ORDER — 0.9 % SODIUM CHLORIDE 0.9 %
500 INTRAVENOUS SOLUTION INTRAVENOUS ONCE
Status: COMPLETED | OUTPATIENT
Start: 2025-09-01 | End: 2025-09-01

## 2025-09-01 RX ORDER — GLUCAGON 1 MG/ML
1 KIT INJECTION PRN
Status: DISCONTINUED | OUTPATIENT
Start: 2025-09-01 | End: 2025-09-07 | Stop reason: HOSPADM

## 2025-09-01 RX ORDER — NOREPINEPHRINE BITARTRATE 0.03 MG/ML
1-100 INJECTION, SOLUTION INTRAVENOUS CONTINUOUS
Status: DISCONTINUED | OUTPATIENT
Start: 2025-09-01 | End: 2025-09-01 | Stop reason: SDUPTHER

## 2025-09-01 RX ORDER — CLOPIDOGREL BISULFATE 75 MG/1
75 TABLET ORAL DAILY
Status: DISCONTINUED | OUTPATIENT
Start: 2025-09-01 | End: 2025-09-07 | Stop reason: HOSPADM

## 2025-09-01 RX ORDER — DORZOLAMIDE HCL 20 MG/ML
1 SOLUTION/ DROPS OPHTHALMIC 2 TIMES DAILY
Status: DISCONTINUED | OUTPATIENT
Start: 2025-09-01 | End: 2025-09-07 | Stop reason: HOSPADM

## 2025-09-01 RX ORDER — ROSUVASTATIN CALCIUM 10 MG/1
10 TABLET, COATED ORAL NIGHTLY
Status: DISCONTINUED | OUTPATIENT
Start: 2025-09-01 | End: 2025-09-07 | Stop reason: HOSPADM

## 2025-09-01 RX ORDER — INSULIN LISPRO 100 [IU]/ML
0-4 INJECTION, SOLUTION INTRAVENOUS; SUBCUTANEOUS EVERY 6 HOURS SCHEDULED
Status: DISCONTINUED | OUTPATIENT
Start: 2025-09-01 | End: 2025-09-07 | Stop reason: HOSPADM

## 2025-09-01 RX ORDER — ALBUMIN HUMAN 50 G/1000ML
25 SOLUTION INTRAVENOUS ONCE
Status: COMPLETED | OUTPATIENT
Start: 2025-09-01 | End: 2025-09-01

## 2025-09-01 RX ORDER — DORZOLAMIDE HYDROCHLORIDE AND TIMOLOL MALEATE 20; 5 MG/ML; MG/ML
1 SOLUTION/ DROPS OPHTHALMIC 2 TIMES DAILY
Status: DISCONTINUED | OUTPATIENT
Start: 2025-09-01 | End: 2025-09-01 | Stop reason: CLARIF

## 2025-09-01 RX ORDER — MIDAZOLAM HYDROCHLORIDE 2 MG/2ML
2 INJECTION, SOLUTION INTRAMUSCULAR; INTRAVENOUS EVERY 4 HOURS PRN
Status: DISCONTINUED | OUTPATIENT
Start: 2025-09-01 | End: 2025-09-07 | Stop reason: HOSPADM

## 2025-09-01 RX ORDER — DEXTROSE MONOHYDRATE 100 MG/ML
INJECTION, SOLUTION INTRAVENOUS CONTINUOUS PRN
Status: DISCONTINUED | OUTPATIENT
Start: 2025-09-01 | End: 2025-09-07 | Stop reason: HOSPADM

## 2025-09-01 RX ADMIN — TIMOLOL MALEATE 1 DROP: 5 SOLUTION OPHTHALMIC at 21:00

## 2025-09-01 RX ADMIN — TIMOLOL MALEATE 1 DROP: 5 SOLUTION OPHTHALMIC at 09:15

## 2025-09-01 RX ADMIN — SODIUM CHLORIDE, PRESERVATIVE FREE 10 ML: 5 INJECTION INTRAVENOUS at 09:14

## 2025-09-01 RX ADMIN — MEROPENEM 500 MG: 500 INJECTION INTRAVENOUS at 15:34

## 2025-09-01 RX ADMIN — NOREPINEPHRINE BITARTRATE 12 MCG/MIN: 32 SOLUTION INTRAVENOUS at 05:26

## 2025-09-01 RX ADMIN — Medication: at 17:20

## 2025-09-01 RX ADMIN — DORZOLAMIDE HYDROCHLORIDE 1 DROP: 20 SOLUTION/ DROPS OPHTHALMIC at 09:15

## 2025-09-01 RX ADMIN — ALBUMIN (HUMAN) 25 G: 12.5 INJECTION, SOLUTION INTRAVENOUS at 01:49

## 2025-09-01 RX ADMIN — PIPERACILLIN AND TAZOBACTAM 4500 MG: 4; .5 INJECTION, POWDER, LYOPHILIZED, FOR SOLUTION INTRAVENOUS at 09:13

## 2025-09-01 RX ADMIN — PANTOPRAZOLE SODIUM 40 MG: 40 INJECTION, POWDER, FOR SOLUTION INTRAVENOUS at 09:14

## 2025-09-01 RX ADMIN — SODIUM CHLORIDE 500 ML: 0.9 INJECTION, SOLUTION INTRAVENOUS at 09:05

## 2025-09-01 RX ADMIN — DORZOLAMIDE HYDROCHLORIDE 1 DROP: 20 SOLUTION/ DROPS OPHTHALMIC at 21:00

## 2025-09-01 ASSESSMENT — PULMONARY FUNCTION TESTS
PIF_VALUE: 17
PIF_VALUE: 18

## 2025-09-01 ASSESSMENT — PAIN SCALES - GENERAL: PAINLEVEL_OUTOF10: 0

## 2025-09-02 ENCOUNTER — APPOINTMENT (OUTPATIENT)
Age: 89
End: 2025-09-02
Attending: STUDENT IN AN ORGANIZED HEALTH CARE EDUCATION/TRAINING PROGRAM
Payer: MEDICARE

## 2025-09-02 PROBLEM — G93.1 ANOXIC BRAIN DAMAGE (HCC): Status: ACTIVE | Noted: 2025-09-02

## 2025-09-02 LAB
ALBUMIN SERPL-MCNC: 2.4 G/DL (ref 3.4–5)
ALBUMIN SERPL-MCNC: 2.5 G/DL (ref 3.4–5)
ALP SERPL-CCNC: 425 U/L (ref 40–129)
ALT SERPL-CCNC: 27 U/L (ref 10–40)
ANION GAP SERPL CALCULATED.3IONS-SCNC: 10 MMOL/L (ref 3–16)
ANION GAP SERPL CALCULATED.3IONS-SCNC: 14 MMOL/L (ref 3–16)
ANTI-XA UNFRAC HEPARIN: 0.43 IU/ML (ref 0.3–0.7)
AST SERPL-CCNC: 119 U/L (ref 15–37)
BACTERIA UR CULT: NORMAL
BASE EXCESS BLDA CALC-SCNC: -2.3 MMOL/L (ref -3–3)
BASOPHILS # BLD: 0 K/UL (ref 0–0.2)
BASOPHILS NFR BLD: 0.1 %
BILIRUB DIRECT SERPL-MCNC: 0.3 MG/DL (ref 0–0.3)
BILIRUB INDIRECT SERPL-MCNC: 0.2 MG/DL (ref 0–1)
BILIRUB SERPL-MCNC: 0.5 MG/DL (ref 0–1)
BUN SERPL-MCNC: 16 MG/DL (ref 7–20)
BUN SERPL-MCNC: 19 MG/DL (ref 7–20)
CA-I BLD-SCNC: 1.05 MMOL/L (ref 1.12–1.32)
CA-I BLD-SCNC: 1.08 MMOL/L (ref 1.12–1.32)
CA-I BLD-SCNC: 1.09 MMOL/L (ref 1.12–1.32)
CA-I BLD-SCNC: 1.09 MMOL/L (ref 1.12–1.32)
CALCIUM SERPL-MCNC: 8.1 MG/DL (ref 8.3–10.6)
CALCIUM SERPL-MCNC: 8.3 MG/DL (ref 8.3–10.6)
CHLORIDE SERPL-SCNC: 100 MMOL/L (ref 99–110)
CHLORIDE SERPL-SCNC: 101 MMOL/L (ref 99–110)
CO2 BLDA-SCNC: 51.5 MMOL/L
CO2 SERPL-SCNC: 21 MMOL/L (ref 21–32)
CO2 SERPL-SCNC: 23 MMOL/L (ref 21–32)
COHGB MFR BLDA: 1.3 % (ref 0–1.5)
CREAT SERPL-MCNC: 2.6 MG/DL (ref 0.8–1.3)
CREAT SERPL-MCNC: 3 MG/DL (ref 0.8–1.3)
DEPRECATED RDW RBC AUTO: 20.2 % (ref 12.4–15.4)
ECHO AO ASC DIAM: 3.3 CM
ECHO AO ASCENDING AORTA INDEX: 2.13 CM/M2
ECHO AO ROOT DIAM: 2.4 CM
ECHO AO ROOT INDEX: 1.55 CM/M2
ECHO AV AREA PEAK VELOCITY: 2 CM2
ECHO AV AREA VTI: 2 CM2
ECHO AV AREA/BSA PEAK VELOCITY: 1.3 CM2/M2
ECHO AV AREA/BSA VTI: 1.3 CM2/M2
ECHO AV MEAN GRADIENT: 7 MMHG
ECHO AV MEAN VELOCITY: 1.2 M/S
ECHO AV PEAK GRADIENT: 14 MMHG
ECHO AV PEAK VELOCITY: 1.9 M/S
ECHO AV VELOCITY RATIO: 0.58
ECHO AV VTI: 34.9 CM
ECHO BSA: 1.53 M2
ECHO EST RA PRESSURE: 3 MMHG
ECHO IVC INSP: 0.6 CM
ECHO IVC PROX: 1.8 CM
ECHO LA AREA 2C: 13.1 CM2
ECHO LA AREA 4C: 13.1 CM2
ECHO LA MAJOR AXIS: 5.1 CM
ECHO LA MINOR AXIS: 4.5 CM
ECHO LA VOL BP: 30 ML (ref 18–58)
ECHO LA VOL MOD A2C: 31 ML (ref 18–58)
ECHO LA VOL MOD A4C: 27 ML (ref 18–58)
ECHO LA VOL/BSA BIPLANE: 19 ML/M2 (ref 16–34)
ECHO LA VOLUME INDEX MOD A2C: 20 ML/M2 (ref 16–34)
ECHO LA VOLUME INDEX MOD A4C: 17 ML/M2 (ref 16–34)
ECHO LV E' LATERAL VELOCITY: 4.68 CM/S
ECHO LV E' SEPTAL VELOCITY: 2.94 CM/S
ECHO LV EDV A2C: 29 ML
ECHO LV EDV A4C: 54 ML
ECHO LV EDV INDEX A4C: 35 ML/M2
ECHO LV EDV NDEX A2C: 19 ML/M2
ECHO LV EF PHYSICIAN: 59 %
ECHO LV EJECTION FRACTION A2C: 62 %
ECHO LV EJECTION FRACTION A4C: 54 %
ECHO LV EJECTION FRACTION BIPLANE: 59 % (ref 55–100)
ECHO LV ESV A2C: 11 ML
ECHO LV ESV A4C: 25 ML
ECHO LV ESV INDEX A2C: 7 ML/M2
ECHO LV ESV INDEX A4C: 16 ML/M2
ECHO LV FRACTIONAL SHORTENING: 38 % (ref 28–44)
ECHO LV INTERNAL DIMENSION DIASTOLE INDEX: 2.58 CM/M2
ECHO LV INTERNAL DIMENSION DIASTOLIC: 4 CM (ref 4.2–5.9)
ECHO LV INTERNAL DIMENSION SYSTOLIC INDEX: 1.61 CM/M2
ECHO LV INTERNAL DIMENSION SYSTOLIC: 2.5 CM
ECHO LV IVSD: 0.9 CM (ref 0.6–1)
ECHO LV MASS 2D: 109.7 G (ref 88–224)
ECHO LV MASS INDEX 2D: 70.8 G/M2 (ref 49–115)
ECHO LV POSTERIOR WALL DIASTOLIC: 0.9 CM (ref 0.6–1)
ECHO LV RELATIVE WALL THICKNESS RATIO: 0.45
ECHO LVOT AREA: 3.5 CM2
ECHO LVOT AV VTI INDEX: 0.58
ECHO LVOT DIAM: 2.1 CM
ECHO LVOT MEAN GRADIENT: 2 MMHG
ECHO LVOT PEAK GRADIENT: 5 MMHG
ECHO LVOT PEAK VELOCITY: 1.1 M/S
ECHO LVOT STROKE VOLUME INDEX: 45.3 ML/M2
ECHO LVOT SV: 70.3 ML
ECHO LVOT VTI: 20.3 CM
ECHO MV A VELOCITY: 1.19 M/S
ECHO MV E DECELERATION TIME (DT): 375 MS
ECHO MV E VELOCITY: 0.7 M/S
ECHO MV E/A RATIO: 0.59
ECHO MV E/E' LATERAL: 14.96
ECHO MV E/E' RATIO (AVERAGED): 19.38
ECHO MV E/E' SEPTAL: 23.81
ECHO RA AREA 4C: 10.3 CM2
ECHO RA END SYSTOLIC VOLUME APICAL 4 CHAMBER INDEX BSA: 13 ML/M2
ECHO RA VOLUME: 20 ML
ECHO RIGHT VENTRICULAR SYSTOLIC PRESSURE (RVSP): 30 MMHG
ECHO RV BASAL DIMENSION: 3.4 CM
ECHO RV FREE WALL PEAK S': 10.9 CM/S
ECHO RV MID DIMENSION: 3 CM
ECHO RV TAPSE: 1.4 CM (ref 1.7–?)
ECHO TV REGURGITANT MAX VELOCITY: 2.61 M/S
ECHO TV REGURGITANT PEAK GRADIENT: 27 MMHG
EOSINOPHIL # BLD: 0 K/UL (ref 0–0.6)
EOSINOPHIL NFR BLD: 0.1 %
EST. AVERAGE GLUCOSE BLD GHB EST-MCNC: 93.9 MG/DL
GFR SERPLBLD CREATININE-BSD FMLA CKD-EPI: 18 ML/MIN/{1.73_M2}
GFR SERPLBLD CREATININE-BSD FMLA CKD-EPI: 22 ML/MIN/{1.73_M2}
GLUCOSE BLD-MCNC: 106 MG/DL (ref 70–99)
GLUCOSE BLD-MCNC: 82 MG/DL (ref 70–99)
GLUCOSE BLD-MCNC: 92 MG/DL (ref 70–99)
GLUCOSE SERPL-MCNC: 95 MG/DL (ref 70–99)
GLUCOSE SERPL-MCNC: 99 MG/DL (ref 70–99)
HBA1C MFR BLD: 4.9 %
HCO3 BLDA-SCNC: 22 MMOL/L (ref 21–29)
HCT VFR BLD AUTO: 25.8 % (ref 40.5–52.5)
HGB BLD-MCNC: 8.3 G/DL (ref 13.5–17.5)
HGB BLDA-MCNC: 7.7 G/DL (ref 13.5–17.5)
LYMPHOCYTES # BLD: 1.6 K/UL (ref 1–5.1)
LYMPHOCYTES NFR BLD: 7.9 %
MAGNESIUM SERPL-MCNC: 1.98 MG/DL (ref 1.8–2.4)
MAGNESIUM SERPL-MCNC: 2.01 MG/DL (ref 1.8–2.4)
MAGNESIUM SERPL-MCNC: 2.04 MG/DL (ref 1.8–2.4)
MCH RBC QN AUTO: 27 PG (ref 26–34)
MCHC RBC AUTO-ENTMCNC: 32.2 G/DL (ref 31–36)
MCV RBC AUTO: 83.9 FL (ref 80–100)
METHGB MFR BLDA: 0.3 %
MONOCYTES # BLD: 1.4 K/UL (ref 0–1.3)
MONOCYTES NFR BLD: 7.1 %
NEUTROPHILS # BLD: 17 K/UL (ref 1.7–7.7)
NEUTROPHILS NFR BLD: 84.8 %
O2 THERAPY: ABNORMAL
PCO2 BLDA: 34.2 MMHG (ref 35–45)
PERFORMED ON: ABNORMAL
PERFORMED ON: NORMAL
PERFORMED ON: NORMAL
PH BLDA: 7.42 [PH] (ref 7.35–7.45)
PH BLDV: 7.37 [PH] (ref 7.35–7.45)
PH BLDV: 7.38 [PH] (ref 7.35–7.45)
PH BLDV: 7.38 [PH] (ref 7.35–7.45)
PH BLDV: 7.4 [PH] (ref 7.35–7.45)
PHOSPHATE SERPL-MCNC: 2.6 MG/DL (ref 2.5–4.9)
PHOSPHATE SERPL-MCNC: 3.9 MG/DL (ref 2.5–4.9)
PLATELET # BLD AUTO: 303 K/UL (ref 135–450)
PMV BLD AUTO: 6.8 FL (ref 5–10.5)
PO2 BLDA: 89.1 MMHG (ref 75–108)
POTASSIUM SERPL-SCNC: 4.4 MMOL/L (ref 3.5–5.1)
POTASSIUM SERPL-SCNC: 4.6 MMOL/L (ref 3.5–5.1)
PROT SERPL-MCNC: 6.5 G/DL (ref 6.4–8.2)
RBC # BLD AUTO: 3.07 M/UL (ref 4.2–5.9)
SAO2 % BLDA: 97.7 %
SODIUM SERPL-SCNC: 134 MMOL/L (ref 136–145)
SODIUM SERPL-SCNC: 135 MMOL/L (ref 136–145)
VANCOMYCIN SERPL-MCNC: 11.6 UG/ML
WBC # BLD AUTO: 20 K/UL (ref 4–11)

## 2025-09-02 PROCEDURE — 2580000003 HC RX 258: Performed by: INTERNAL MEDICINE

## 2025-09-02 PROCEDURE — 85025 COMPLETE CBC W/AUTO DIFF WBC: CPT

## 2025-09-02 PROCEDURE — 6360000002 HC RX W HCPCS: Performed by: INTERNAL MEDICINE

## 2025-09-02 PROCEDURE — 94761 N-INVAS EAR/PLS OXIMETRY MLT: CPT

## 2025-09-02 PROCEDURE — 84100 ASSAY OF PHOSPHORUS: CPT

## 2025-09-02 PROCEDURE — 2500000003 HC RX 250 WO HCPCS: Performed by: EMERGENCY MEDICINE

## 2025-09-02 PROCEDURE — 2700000000 HC OXYGEN THERAPY PER DAY

## 2025-09-02 PROCEDURE — 90945 DIALYSIS ONE EVALUATION: CPT

## 2025-09-02 PROCEDURE — 83735 ASSAY OF MAGNESIUM: CPT

## 2025-09-02 PROCEDURE — 6360000002 HC RX W HCPCS: Performed by: EMERGENCY MEDICINE

## 2025-09-02 PROCEDURE — 80076 HEPATIC FUNCTION PANEL: CPT

## 2025-09-02 PROCEDURE — APPSS60 APP SPLIT SHARED TIME 46-60 MINUTES: Performed by: NURSE PRACTITIONER

## 2025-09-02 PROCEDURE — 99291 CRITICAL CARE FIRST HOUR: CPT | Performed by: INTERNAL MEDICINE

## 2025-09-02 PROCEDURE — 87040 BLOOD CULTURE FOR BACTERIA: CPT

## 2025-09-02 PROCEDURE — 2500000003 HC RX 250 WO HCPCS

## 2025-09-02 PROCEDURE — 99222 1ST HOSP IP/OBS MODERATE 55: CPT | Performed by: SURGERY

## 2025-09-02 PROCEDURE — 85520 HEPARIN ASSAY: CPT

## 2025-09-02 PROCEDURE — 94003 VENT MGMT INPAT SUBQ DAY: CPT

## 2025-09-02 PROCEDURE — 83036 HEMOGLOBIN GLYCOSYLATED A1C: CPT

## 2025-09-02 PROCEDURE — 6370000000 HC RX 637 (ALT 250 FOR IP): Performed by: STUDENT IN AN ORGANIZED HEALTH CARE EDUCATION/TRAINING PROGRAM

## 2025-09-02 PROCEDURE — 6360000002 HC RX W HCPCS: Performed by: HOSPITALIST

## 2025-09-02 PROCEDURE — 93306 TTE W/DOPPLER COMPLETE: CPT

## 2025-09-02 PROCEDURE — 36415 COLL VENOUS BLD VENIPUNCTURE: CPT

## 2025-09-02 PROCEDURE — 2000000000 HC ICU R&B

## 2025-09-02 PROCEDURE — 80069 RENAL FUNCTION PANEL: CPT

## 2025-09-02 PROCEDURE — 93306 TTE W/DOPPLER COMPLETE: CPT | Performed by: INTERNAL MEDICINE

## 2025-09-02 PROCEDURE — 2580000003 HC RX 258: Performed by: HOSPITALIST

## 2025-09-02 PROCEDURE — APPNB30 APP NON BILLABLE TIME 0-30 MINS: Performed by: NURSE PRACTITIONER

## 2025-09-02 PROCEDURE — 80202 ASSAY OF VANCOMYCIN: CPT

## 2025-09-02 PROCEDURE — 82803 BLOOD GASES ANY COMBINATION: CPT

## 2025-09-02 PROCEDURE — 99223 1ST HOSP IP/OBS HIGH 75: CPT | Performed by: PSYCHIATRY & NEUROLOGY

## 2025-09-02 PROCEDURE — 6360000002 HC RX W HCPCS: Performed by: STUDENT IN AN ORGANIZED HEALTH CARE EDUCATION/TRAINING PROGRAM

## 2025-09-02 PROCEDURE — 82330 ASSAY OF CALCIUM: CPT

## 2025-09-02 RX ADMIN — MEROPENEM 500 MG: 500 INJECTION INTRAVENOUS at 17:07

## 2025-09-02 RX ADMIN — EPOETIN ALFA-EPBX 5000 UNITS: 10000 INJECTION, SOLUTION INTRAVENOUS; SUBCUTANEOUS at 17:57

## 2025-09-02 RX ADMIN — DORZOLAMIDE HYDROCHLORIDE 1 DROP: 20 SOLUTION/ DROPS OPHTHALMIC at 21:00

## 2025-09-02 RX ADMIN — Medication: at 23:33

## 2025-09-02 RX ADMIN — DORZOLAMIDE HYDROCHLORIDE 1 DROP: 20 SOLUTION/ DROPS OPHTHALMIC at 08:35

## 2025-09-02 RX ADMIN — TIMOLOL MALEATE 1 DROP: 5 SOLUTION OPHTHALMIC at 21:00

## 2025-09-02 RX ADMIN — NOREPINEPHRINE BITARTRATE 12 MCG/MIN: 32 SOLUTION INTRAVENOUS at 18:26

## 2025-09-02 RX ADMIN — Medication: at 13:33

## 2025-09-02 RX ADMIN — SODIUM CHLORIDE, PRESERVATIVE FREE 10 ML: 5 INJECTION INTRAVENOUS at 08:35

## 2025-09-02 RX ADMIN — CLOPIDOGREL BISULFATE 75 MG: 75 TABLET, FILM COATED ORAL at 08:35

## 2025-09-02 RX ADMIN — VANCOMYCIN HYDROCHLORIDE 500 MG: 500 INJECTION, POWDER, LYOPHILIZED, FOR SOLUTION INTRAVENOUS at 08:34

## 2025-09-02 RX ADMIN — Medication: at 13:07

## 2025-09-02 RX ADMIN — Medication: at 03:15

## 2025-09-02 RX ADMIN — HEPARIN SODIUM 18 UNITS/KG/HR: 10000 INJECTION, SOLUTION INTRAVENOUS at 01:05

## 2025-09-02 RX ADMIN — NOREPINEPHRINE BITARTRATE 16 MCG/MIN: 32 SOLUTION INTRAVENOUS at 07:14

## 2025-09-02 RX ADMIN — MEROPENEM 500 MG: 500 INJECTION INTRAVENOUS at 09:51

## 2025-09-02 RX ADMIN — Medication: at 13:08

## 2025-09-02 RX ADMIN — PANTOPRAZOLE SODIUM 40 MG: 40 INJECTION, POWDER, FOR SOLUTION INTRAVENOUS at 08:35

## 2025-09-02 RX ADMIN — TIMOLOL MALEATE 1 DROP: 5 SOLUTION OPHTHALMIC at 08:35

## 2025-09-02 ASSESSMENT — PULMONARY FUNCTION TESTS
PIF_VALUE: 18
PIF_VALUE: 17
PIF_VALUE: 17
PIF_VALUE: 18

## 2025-09-02 ASSESSMENT — PAIN SCALES - GENERAL
PAINLEVEL_OUTOF10: 0

## 2025-09-03 LAB
ALBUMIN SERPL-MCNC: 2.2 G/DL (ref 3.4–5)
ALBUMIN SERPL-MCNC: 2.3 G/DL (ref 3.4–5)
ALBUMIN SERPL-MCNC: 2.4 G/DL (ref 3.4–5)
ALBUMIN SERPL-MCNC: 2.4 G/DL (ref 3.4–5)
ALP SERPL-CCNC: 426 U/L (ref 40–129)
ALT SERPL-CCNC: 22 U/L (ref 10–40)
ANION GAP SERPL CALCULATED.3IONS-SCNC: 10 MMOL/L (ref 3–16)
ANTI-XA UNFRAC HEPARIN: 0.43 IU/ML (ref 0.3–0.7)
AST SERPL-CCNC: 88 U/L (ref 15–37)
BASE EXCESS BLDA CALC-SCNC: 1 MMOL/L (ref -3–3)
BASOPHILS # BLD: 0.1 K/UL (ref 0–0.2)
BASOPHILS NFR BLD: 0.3 %
BILIRUB DIRECT SERPL-MCNC: 0.4 MG/DL (ref 0–0.3)
BILIRUB INDIRECT SERPL-MCNC: 0.3 MG/DL (ref 0–1)
BILIRUB SERPL-MCNC: 0.7 MG/DL (ref 0–1)
BUN SERPL-MCNC: 10 MG/DL (ref 7–20)
BUN SERPL-MCNC: 12 MG/DL (ref 7–20)
BUN SERPL-MCNC: 9 MG/DL (ref 7–20)
CA-I BLD-SCNC: 1.07 MMOL/L (ref 1.12–1.32)
CA-I BLD-SCNC: 1.08 MMOL/L (ref 1.12–1.32)
CA-I BLD-SCNC: 1.11 MMOL/L (ref 1.12–1.32)
CA-I BLD-SCNC: 1.15 MMOL/L (ref 1.12–1.32)
CA-I BLD-SCNC: 1.18 MMOL/L (ref 1.12–1.32)
CALCIUM SERPL-MCNC: 8.5 MG/DL (ref 8.3–10.6)
CALCIUM SERPL-MCNC: 8.6 MG/DL (ref 8.3–10.6)
CALCIUM SERPL-MCNC: 8.7 MG/DL (ref 8.3–10.6)
CHLORIDE SERPL-SCNC: 100 MMOL/L (ref 99–110)
CHLORIDE SERPL-SCNC: 102 MMOL/L (ref 99–110)
CHLORIDE SERPL-SCNC: 102 MMOL/L (ref 99–110)
CO2 BLDA-SCNC: 58.3 MMOL/L
CO2 SERPL-SCNC: 23 MMOL/L (ref 21–32)
CO2 SERPL-SCNC: 23 MMOL/L (ref 21–32)
CO2 SERPL-SCNC: 24 MMOL/L (ref 21–32)
COHGB MFR BLDA: 1.8 % (ref 0–1.5)
CREAT SERPL-MCNC: 1.4 MG/DL (ref 0.8–1.3)
CREAT SERPL-MCNC: 1.6 MG/DL (ref 0.8–1.3)
CREAT SERPL-MCNC: 2 MG/DL (ref 0.8–1.3)
DEPRECATED RDW RBC AUTO: 20.5 % (ref 12.4–15.4)
EOSINOPHIL # BLD: 0 K/UL (ref 0–0.6)
EOSINOPHIL NFR BLD: 0.2 %
GFR SERPLBLD CREATININE-BSD FMLA CKD-EPI: 30 ML/MIN/{1.73_M2}
GFR SERPLBLD CREATININE-BSD FMLA CKD-EPI: 39 ML/MIN/{1.73_M2}
GFR SERPLBLD CREATININE-BSD FMLA CKD-EPI: 46 ML/MIN/{1.73_M2}
GLUCOSE BLD-MCNC: 83 MG/DL (ref 70–99)
GLUCOSE BLD-MCNC: 90 MG/DL (ref 70–99)
GLUCOSE BLD-MCNC: 95 MG/DL (ref 70–99)
GLUCOSE BLD-MCNC: 95 MG/DL (ref 70–99)
GLUCOSE SERPL-MCNC: 94 MG/DL (ref 70–99)
GLUCOSE SERPL-MCNC: 97 MG/DL (ref 70–99)
GLUCOSE SERPL-MCNC: 99 MG/DL (ref 70–99)
HCO3 BLDA-SCNC: 24.9 MMOL/L (ref 21–29)
HCT VFR BLD AUTO: 24.3 % (ref 40.5–52.5)
HGB BLD-MCNC: 8.2 G/DL (ref 13.5–17.5)
HGB BLDA-MCNC: 8.2 G/DL (ref 13.5–17.5)
LYMPHOCYTES # BLD: 1.6 K/UL (ref 1–5.1)
LYMPHOCYTES NFR BLD: 8.1 %
MAGNESIUM SERPL-MCNC: 2.15 MG/DL (ref 1.8–2.4)
MAGNESIUM SERPL-MCNC: 2.19 MG/DL (ref 1.8–2.4)
MAGNESIUM SERPL-MCNC: 2.21 MG/DL (ref 1.8–2.4)
MAGNESIUM SERPL-MCNC: 2.3 MG/DL (ref 1.8–2.4)
MCH RBC QN AUTO: 28 PG (ref 26–34)
MCHC RBC AUTO-ENTMCNC: 33.6 G/DL (ref 31–36)
MCV RBC AUTO: 83.1 FL (ref 80–100)
METHGB MFR BLDA: 0.9 %
MONOCYTES # BLD: 1.2 K/UL (ref 0–1.3)
MONOCYTES NFR BLD: 6.4 %
NEUTROPHILS # BLD: 16.5 K/UL (ref 1.7–7.7)
NEUTROPHILS NFR BLD: 85 %
O2 THERAPY: ABNORMAL
PCO2 BLDA: 35.6 MMHG (ref 35–45)
PERFORMED ON: NORMAL
PH BLDA: 7.45 [PH] (ref 7.35–7.45)
PH BLDV: 7.39 [PH] (ref 7.35–7.45)
PH BLDV: 7.44 [PH] (ref 7.35–7.45)
PH BLDV: 7.45 [PH] (ref 7.35–7.45)
PHOSPHATE SERPL-MCNC: 1.8 MG/DL (ref 2.5–4.9)
PHOSPHATE SERPL-MCNC: 2.1 MG/DL (ref 2.5–4.9)
PHOSPHATE SERPL-MCNC: 2.2 MG/DL (ref 2.5–4.9)
PLATELET # BLD AUTO: 274 K/UL (ref 135–450)
PMV BLD AUTO: 6.9 FL (ref 5–10.5)
PO2 BLDA: 89.6 MMHG (ref 75–108)
POTASSIUM SERPL-SCNC: 4.4 MMOL/L (ref 3.5–5.1)
POTASSIUM SERPL-SCNC: 4.4 MMOL/L (ref 3.5–5.1)
POTASSIUM SERPL-SCNC: 4.5 MMOL/L (ref 3.5–5.1)
PROT SERPL-MCNC: 6.4 G/DL (ref 6.4–8.2)
RBC # BLD AUTO: 2.92 M/UL (ref 4.2–5.9)
SAO2 % BLDA: 97.9 %
SODIUM SERPL-SCNC: 133 MMOL/L (ref 136–145)
SODIUM SERPL-SCNC: 135 MMOL/L (ref 136–145)
SODIUM SERPL-SCNC: 136 MMOL/L (ref 136–145)
VANCOMYCIN SERPL-MCNC: 10.8 UG/ML
WBC # BLD AUTO: 19.4 K/UL (ref 4–11)

## 2025-09-03 PROCEDURE — 80076 HEPATIC FUNCTION PANEL: CPT

## 2025-09-03 PROCEDURE — 80069 RENAL FUNCTION PANEL: CPT

## 2025-09-03 PROCEDURE — 2580000003 HC RX 258: Performed by: INTERNAL MEDICINE

## 2025-09-03 PROCEDURE — 94003 VENT MGMT INPAT SUBQ DAY: CPT

## 2025-09-03 PROCEDURE — 85025 COMPLETE CBC W/AUTO DIFF WBC: CPT

## 2025-09-03 PROCEDURE — 6360000002 HC RX W HCPCS: Performed by: INTERNAL MEDICINE

## 2025-09-03 PROCEDURE — 2000000000 HC ICU R&B

## 2025-09-03 PROCEDURE — 2500000003 HC RX 250 WO HCPCS

## 2025-09-03 PROCEDURE — 80202 ASSAY OF VANCOMYCIN: CPT

## 2025-09-03 PROCEDURE — 2700000000 HC OXYGEN THERAPY PER DAY

## 2025-09-03 PROCEDURE — 2580000003 HC RX 258

## 2025-09-03 PROCEDURE — 82803 BLOOD GASES ANY COMBINATION: CPT

## 2025-09-03 PROCEDURE — 99291 CRITICAL CARE FIRST HOUR: CPT | Performed by: INTERNAL MEDICINE

## 2025-09-03 PROCEDURE — 2500000003 HC RX 250 WO HCPCS: Performed by: HOSPITALIST

## 2025-09-03 PROCEDURE — 85520 HEPARIN ASSAY: CPT

## 2025-09-03 PROCEDURE — 6360000002 HC RX W HCPCS: Performed by: EMERGENCY MEDICINE

## 2025-09-03 PROCEDURE — 36592 COLLECT BLOOD FROM PICC: CPT

## 2025-09-03 PROCEDURE — 99232 SBSQ HOSP IP/OBS MODERATE 35: CPT | Performed by: INTERNAL MEDICINE

## 2025-09-03 PROCEDURE — 6360000002 HC RX W HCPCS: Performed by: STUDENT IN AN ORGANIZED HEALTH CARE EDUCATION/TRAINING PROGRAM

## 2025-09-03 PROCEDURE — 2580000003 HC RX 258: Performed by: HOSPITALIST

## 2025-09-03 PROCEDURE — 83735 ASSAY OF MAGNESIUM: CPT

## 2025-09-03 PROCEDURE — 2500000003 HC RX 250 WO HCPCS: Performed by: EMERGENCY MEDICINE

## 2025-09-03 PROCEDURE — 99233 SBSQ HOSP IP/OBS HIGH 50: CPT | Performed by: PSYCHIATRY & NEUROLOGY

## 2025-09-03 PROCEDURE — 82330 ASSAY OF CALCIUM: CPT

## 2025-09-03 PROCEDURE — 6370000000 HC RX 637 (ALT 250 FOR IP): Performed by: STUDENT IN AN ORGANIZED HEALTH CARE EDUCATION/TRAINING PROGRAM

## 2025-09-03 PROCEDURE — 94761 N-INVAS EAR/PLS OXIMETRY MLT: CPT

## 2025-09-03 PROCEDURE — 99233 SBSQ HOSP IP/OBS HIGH 50: CPT | Performed by: INTERNAL MEDICINE

## 2025-09-03 PROCEDURE — 90945 DIALYSIS ONE EVALUATION: CPT

## 2025-09-03 RX ORDER — CALCIUM GLUCONATE 20 MG/ML
1000 INJECTION, SOLUTION INTRAVENOUS PRN
Status: DISCONTINUED | OUTPATIENT
Start: 2025-09-03 | End: 2025-09-05

## 2025-09-03 RX ORDER — SODIUM CHLORIDE 9 MG/ML
INJECTION, SOLUTION INTRAVENOUS PRN
Status: DISCONTINUED | OUTPATIENT
Start: 2025-09-03 | End: 2025-09-07 | Stop reason: HOSPADM

## 2025-09-03 RX ORDER — CALCIUM GLUCONATE 20 MG/ML
2000 INJECTION, SOLUTION INTRAVENOUS PRN
Status: DISCONTINUED | OUTPATIENT
Start: 2025-09-03 | End: 2025-09-05

## 2025-09-03 RX ADMIN — DORZOLAMIDE HYDROCHLORIDE 1 DROP: 20 SOLUTION/ DROPS OPHTHALMIC at 21:17

## 2025-09-03 RX ADMIN — Medication: at 19:22

## 2025-09-03 RX ADMIN — NOREPINEPHRINE BITARTRATE 5 MCG/MIN: 32 SOLUTION INTRAVENOUS at 14:42

## 2025-09-03 RX ADMIN — Medication: at 19:55

## 2025-09-03 RX ADMIN — Medication: at 09:45

## 2025-09-03 RX ADMIN — CALCIUM GLUCONATE 1000 MG: 20 INJECTION, SOLUTION INTRAVENOUS at 09:13

## 2025-09-03 RX ADMIN — MEROPENEM 500 MG: 500 INJECTION INTRAVENOUS at 09:39

## 2025-09-03 RX ADMIN — MEROPENEM 500 MG: 500 INJECTION INTRAVENOUS at 01:49

## 2025-09-03 RX ADMIN — Medication: at 09:37

## 2025-09-03 RX ADMIN — CLOPIDOGREL BISULFATE 75 MG: 75 TABLET, FILM COATED ORAL at 08:46

## 2025-09-03 RX ADMIN — PANTOPRAZOLE SODIUM 40 MG: 40 INJECTION, POWDER, FOR SOLUTION INTRAVENOUS at 08:45

## 2025-09-03 RX ADMIN — SODIUM PHOSPHATE, MONOBASIC, MONOHYDRATE AND SODIUM PHOSPHATE, DIBASIC, ANHYDROUS 12 MMOL: 142; 276 INJECTION, SOLUTION INTRAVENOUS at 12:12

## 2025-09-03 RX ADMIN — SODIUM CHLORIDE, PRESERVATIVE FREE 10 ML: 5 INJECTION INTRAVENOUS at 08:45

## 2025-09-03 RX ADMIN — DORZOLAMIDE HYDROCHLORIDE 1 DROP: 20 SOLUTION/ DROPS OPHTHALMIC at 09:40

## 2025-09-03 RX ADMIN — VANCOMYCIN HYDROCHLORIDE 750 MG: 750 INJECTION, POWDER, LYOPHILIZED, FOR SOLUTION INTRAVENOUS at 08:22

## 2025-09-03 RX ADMIN — Medication: at 19:57

## 2025-09-03 RX ADMIN — TIMOLOL MALEATE 1 DROP: 5 SOLUTION OPHTHALMIC at 09:40

## 2025-09-03 RX ADMIN — SODIUM CHLORIDE: 0.9 INJECTION, SOLUTION INTRAVENOUS at 09:06

## 2025-09-03 RX ADMIN — CALCIUM GLUCONATE 1000 MG: 20 INJECTION, SOLUTION INTRAVENOUS at 17:00

## 2025-09-03 RX ADMIN — SODIUM CHLORIDE: 0.9 INJECTION, SOLUTION INTRAVENOUS at 13:08

## 2025-09-03 RX ADMIN — HEPARIN SODIUM 18 UNITS/KG/HR: 10000 INJECTION, SOLUTION INTRAVENOUS at 05:16

## 2025-09-03 RX ADMIN — MEROPENEM 500 MG: 500 INJECTION INTRAVENOUS at 16:57

## 2025-09-03 RX ADMIN — TIMOLOL MALEATE 1 DROP: 5 SOLUTION OPHTHALMIC at 21:17

## 2025-09-03 RX ADMIN — Medication: at 09:50

## 2025-09-03 ASSESSMENT — PULMONARY FUNCTION TESTS
PIF_VALUE: 18
PIF_VALUE: 17

## 2025-09-03 ASSESSMENT — PAIN SCALES - GENERAL
PAINLEVEL_OUTOF10: 0

## 2025-09-04 LAB
ALBUMIN SERPL-MCNC: 2.2 G/DL (ref 3.4–5)
ALBUMIN SERPL-MCNC: 2.2 G/DL (ref 3.4–5)
ALBUMIN SERPL-MCNC: 2.4 G/DL (ref 3.4–5)
ALP SERPL-CCNC: 424 U/L (ref 40–129)
ALT SERPL-CCNC: 18 U/L (ref 10–40)
ANION GAP SERPL CALCULATED.3IONS-SCNC: 10 MMOL/L (ref 3–16)
ANION GAP SERPL CALCULATED.3IONS-SCNC: 10 MMOL/L (ref 3–16)
ANION GAP SERPL CALCULATED.3IONS-SCNC: 11 MMOL/L (ref 3–16)
ANTI-XA UNFRAC HEPARIN: 0.31 IU/ML (ref 0.3–0.7)
AST SERPL-CCNC: 62 U/L (ref 15–37)
BACTERIA BLD CULT: NORMAL
BASOPHILS # BLD: 0.1 K/UL (ref 0–0.2)
BASOPHILS NFR BLD: 0.3 %
BILIRUB DIRECT SERPL-MCNC: 0.5 MG/DL (ref 0–0.3)
BILIRUB INDIRECT SERPL-MCNC: 0.3 MG/DL (ref 0–1)
BILIRUB SERPL-MCNC: 0.8 MG/DL (ref 0–1)
BUN SERPL-MCNC: 9 MG/DL (ref 7–20)
CA-I BLD-SCNC: 1.1 MMOL/L (ref 1.12–1.32)
CA-I BLD-SCNC: 1.12 MMOL/L (ref 1.12–1.32)
CA-I BLD-SCNC: 1.15 MMOL/L (ref 1.12–1.32)
CALCIUM SERPL-MCNC: 8.5 MG/DL (ref 8.3–10.6)
CALCIUM SERPL-MCNC: 8.6 MG/DL (ref 8.3–10.6)
CALCIUM SERPL-MCNC: 8.6 MG/DL (ref 8.3–10.6)
CHLORIDE SERPL-SCNC: 102 MMOL/L (ref 99–110)
CHLORIDE SERPL-SCNC: 102 MMOL/L (ref 99–110)
CHLORIDE SERPL-SCNC: 103 MMOL/L (ref 99–110)
CO2 SERPL-SCNC: 23 MMOL/L (ref 21–32)
CREAT SERPL-MCNC: 1.2 MG/DL (ref 0.8–1.3)
CREAT SERPL-MCNC: 1.2 MG/DL (ref 0.8–1.3)
CREAT SERPL-MCNC: 1.3 MG/DL (ref 0.8–1.3)
DEPRECATED RDW RBC AUTO: 20.8 % (ref 12.4–15.4)
EOSINOPHIL # BLD: 0.1 K/UL (ref 0–0.6)
EOSINOPHIL NFR BLD: 0.3 %
GFR SERPLBLD CREATININE-BSD FMLA CKD-EPI: 50 ML/MIN/{1.73_M2}
GFR SERPLBLD CREATININE-BSD FMLA CKD-EPI: 56 ML/MIN/{1.73_M2}
GFR SERPLBLD CREATININE-BSD FMLA CKD-EPI: 56 ML/MIN/{1.73_M2}
GLUCOSE BLD-MCNC: 115 MG/DL (ref 70–99)
GLUCOSE BLD-MCNC: 76 MG/DL (ref 70–99)
GLUCOSE BLD-MCNC: 98 MG/DL (ref 70–99)
GLUCOSE BLD-MCNC: 99 MG/DL (ref 70–99)
GLUCOSE SERPL-MCNC: 103 MG/DL (ref 70–99)
GLUCOSE SERPL-MCNC: 109 MG/DL (ref 70–99)
GLUCOSE SERPL-MCNC: 112 MG/DL (ref 70–99)
HCT VFR BLD AUTO: 24.3 % (ref 40.5–52.5)
HGB BLD-MCNC: 7.8 G/DL (ref 13.5–17.5)
LYMPHOCYTES # BLD: 1.2 K/UL (ref 1–5.1)
LYMPHOCYTES NFR BLD: 7.2 %
MAGNESIUM SERPL-MCNC: 2.27 MG/DL (ref 1.8–2.4)
MAGNESIUM SERPL-MCNC: 2.31 MG/DL (ref 1.8–2.4)
MCH RBC QN AUTO: 27.1 PG (ref 26–34)
MCHC RBC AUTO-ENTMCNC: 32 G/DL (ref 31–36)
MCV RBC AUTO: 84.9 FL (ref 80–100)
MONOCYTES # BLD: 0.9 K/UL (ref 0–1.3)
MONOCYTES NFR BLD: 5.3 %
NEUTROPHILS # BLD: 14.8 K/UL (ref 1.7–7.7)
NEUTROPHILS NFR BLD: 86.9 %
PERFORMED ON: ABNORMAL
PERFORMED ON: NORMAL
PH BLDV: 7.4 [PH] (ref 7.35–7.45)
PH BLDV: 7.41 [PH] (ref 7.35–7.45)
PH BLDV: 7.43 [PH] (ref 7.35–7.45)
PHOSPHATE SERPL-MCNC: 2.1 MG/DL (ref 2.5–4.9)
PHOSPHATE SERPL-MCNC: 2.3 MG/DL (ref 2.5–4.9)
PLATELET # BLD AUTO: 250 K/UL (ref 135–450)
PMV BLD AUTO: 7.1 FL (ref 5–10.5)
POTASSIUM SERPL-SCNC: 4.2 MMOL/L (ref 3.5–5.1)
POTASSIUM SERPL-SCNC: 4.3 MMOL/L (ref 3.5–5.1)
POTASSIUM SERPL-SCNC: 4.4 MMOL/L (ref 3.5–5.1)
PROT SERPL-MCNC: 6.3 G/DL (ref 6.4–8.2)
RBC # BLD AUTO: 2.86 M/UL (ref 4.2–5.9)
SODIUM SERPL-SCNC: 135 MMOL/L (ref 136–145)
SODIUM SERPL-SCNC: 136 MMOL/L (ref 136–145)
SODIUM SERPL-SCNC: 136 MMOL/L (ref 136–145)
VANCOMYCIN SERPL-MCNC: 12 UG/ML
WBC # BLD AUTO: 17.1 K/UL (ref 4–11)

## 2025-09-04 PROCEDURE — 99233 SBSQ HOSP IP/OBS HIGH 50: CPT | Performed by: PSYCHIATRY & NEUROLOGY

## 2025-09-04 PROCEDURE — 80076 HEPATIC FUNCTION PANEL: CPT

## 2025-09-04 PROCEDURE — 6360000002 HC RX W HCPCS: Performed by: INTERNAL MEDICINE

## 2025-09-04 PROCEDURE — 6370000000 HC RX 637 (ALT 250 FOR IP): Performed by: STUDENT IN AN ORGANIZED HEALTH CARE EDUCATION/TRAINING PROGRAM

## 2025-09-04 PROCEDURE — 99291 CRITICAL CARE FIRST HOUR: CPT | Performed by: INTERNAL MEDICINE

## 2025-09-04 PROCEDURE — 2580000003 HC RX 258: Performed by: INTERNAL MEDICINE

## 2025-09-04 PROCEDURE — 6360000002 HC RX W HCPCS: Performed by: HOSPITALIST

## 2025-09-04 PROCEDURE — 94761 N-INVAS EAR/PLS OXIMETRY MLT: CPT

## 2025-09-04 PROCEDURE — 83735 ASSAY OF MAGNESIUM: CPT

## 2025-09-04 PROCEDURE — 36592 COLLECT BLOOD FROM PICC: CPT

## 2025-09-04 PROCEDURE — 2500000003 HC RX 250 WO HCPCS: Performed by: EMERGENCY MEDICINE

## 2025-09-04 PROCEDURE — 85520 HEPARIN ASSAY: CPT

## 2025-09-04 PROCEDURE — 2500000003 HC RX 250 WO HCPCS: Performed by: HOSPITALIST

## 2025-09-04 PROCEDURE — 95822 EEG COMA OR SLEEP ONLY: CPT | Performed by: PSYCHIATRY & NEUROLOGY

## 2025-09-04 PROCEDURE — 6360000002 HC RX W HCPCS: Performed by: EMERGENCY MEDICINE

## 2025-09-04 PROCEDURE — 2000000000 HC ICU R&B

## 2025-09-04 PROCEDURE — 85025 COMPLETE CBC W/AUTO DIFF WBC: CPT

## 2025-09-04 PROCEDURE — APPSS30 APP SPLIT SHARED TIME 16-30 MINUTES

## 2025-09-04 PROCEDURE — 80202 ASSAY OF VANCOMYCIN: CPT

## 2025-09-04 PROCEDURE — 94003 VENT MGMT INPAT SUBQ DAY: CPT

## 2025-09-04 PROCEDURE — 90945 DIALYSIS ONE EVALUATION: CPT

## 2025-09-04 PROCEDURE — 2580000003 HC RX 258: Performed by: HOSPITALIST

## 2025-09-04 PROCEDURE — 6360000002 HC RX W HCPCS: Performed by: STUDENT IN AN ORGANIZED HEALTH CARE EDUCATION/TRAINING PROGRAM

## 2025-09-04 PROCEDURE — 2700000000 HC OXYGEN THERAPY PER DAY

## 2025-09-04 PROCEDURE — 82330 ASSAY OF CALCIUM: CPT

## 2025-09-04 PROCEDURE — 95819 EEG AWAKE AND ASLEEP: CPT

## 2025-09-04 PROCEDURE — 80069 RENAL FUNCTION PANEL: CPT

## 2025-09-04 RX ADMIN — Medication: at 16:15

## 2025-09-04 RX ADMIN — SODIUM PHOSPHATE, MONOBASIC, MONOHYDRATE AND SODIUM PHOSPHATE, DIBASIC, ANHYDROUS 6 MMOL: 142; 276 INJECTION, SOLUTION INTRAVENOUS at 20:15

## 2025-09-04 RX ADMIN — EPOETIN ALFA-EPBX 5000 UNITS: 10000 INJECTION, SOLUTION INTRAVENOUS; SUBCUTANEOUS at 17:28

## 2025-09-04 RX ADMIN — VANCOMYCIN HYDROCHLORIDE 750 MG: 750 INJECTION, POWDER, LYOPHILIZED, FOR SOLUTION INTRAVENOUS at 08:04

## 2025-09-04 RX ADMIN — Medication: at 16:29

## 2025-09-04 RX ADMIN — CALCIUM GLUCONATE 1000 MG: 20 INJECTION, SOLUTION INTRAVENOUS at 18:03

## 2025-09-04 RX ADMIN — DORZOLAMIDE HYDROCHLORIDE 1 DROP: 20 SOLUTION/ DROPS OPHTHALMIC at 20:17

## 2025-09-04 RX ADMIN — DORZOLAMIDE HYDROCHLORIDE 1 DROP: 20 SOLUTION/ DROPS OPHTHALMIC at 10:23

## 2025-09-04 RX ADMIN — Medication: at 06:17

## 2025-09-04 RX ADMIN — MEROPENEM 500 MG: 500 INJECTION INTRAVENOUS at 17:03

## 2025-09-04 RX ADMIN — TIMOLOL MALEATE 1 DROP: 5 SOLUTION OPHTHALMIC at 10:23

## 2025-09-04 RX ADMIN — HEPARIN SODIUM 1800 UNITS: 1000 INJECTION, SOLUTION INTRAVENOUS; SUBCUTANEOUS at 23:18

## 2025-09-04 RX ADMIN — HEPARIN SODIUM 1700 UNITS: 1000 INJECTION, SOLUTION INTRAVENOUS; SUBCUTANEOUS at 23:17

## 2025-09-04 RX ADMIN — CLOPIDOGREL BISULFATE 75 MG: 75 TABLET, FILM COATED ORAL at 08:50

## 2025-09-04 RX ADMIN — TIMOLOL MALEATE 1 DROP: 5 SOLUTION OPHTHALMIC at 20:17

## 2025-09-04 RX ADMIN — HEPARIN SODIUM 18 UNITS/KG/HR: 10000 INJECTION, SOLUTION INTRAVENOUS at 09:19

## 2025-09-04 RX ADMIN — MEROPENEM 500 MG: 500 INJECTION INTRAVENOUS at 09:16

## 2025-09-04 RX ADMIN — SODIUM PHOSPHATE, MONOBASIC, MONOHYDRATE AND SODIUM PHOSPHATE, DIBASIC, ANHYDROUS 6 MMOL: 142; 276 INJECTION, SOLUTION INTRAVENOUS at 11:27

## 2025-09-04 RX ADMIN — NOREPINEPHRINE BITARTRATE 5 MCG/MIN: 32 SOLUTION INTRAVENOUS at 18:16

## 2025-09-04 RX ADMIN — MEROPENEM 500 MG: 500 INJECTION INTRAVENOUS at 01:06

## 2025-09-04 RX ADMIN — SODIUM PHOSPHATE, MONOBASIC, MONOHYDRATE AND SODIUM PHOSPHATE, DIBASIC, ANHYDROUS 6 MMOL: 142; 276 INJECTION, SOLUTION INTRAVENOUS at 09:07

## 2025-09-04 RX ADMIN — HEPARIN SODIUM 5000 UNITS: 5000 INJECTION, SOLUTION INTRAVENOUS; SUBCUTANEOUS at 23:55

## 2025-09-04 RX ADMIN — Medication: at 06:16

## 2025-09-04 RX ADMIN — Medication: at 16:28

## 2025-09-04 RX ADMIN — CALCIUM GLUCONATE 1000 MG: 20 INJECTION, SOLUTION INTRAVENOUS at 20:57

## 2025-09-04 RX ADMIN — HEPARIN SODIUM 5000 UNITS: 5000 INJECTION, SOLUTION INTRAVENOUS; SUBCUTANEOUS at 23:50

## 2025-09-04 RX ADMIN — PANTOPRAZOLE SODIUM 40 MG: 40 INJECTION, POWDER, FOR SOLUTION INTRAVENOUS at 08:51

## 2025-09-04 ASSESSMENT — PAIN SCALES - GENERAL
PAINLEVEL_OUTOF10: 0

## 2025-09-04 ASSESSMENT — PULMONARY FUNCTION TESTS
PIF_VALUE: 16
PIF_VALUE: 18
PIF_VALUE: 18

## 2025-09-05 ENCOUNTER — APPOINTMENT (OUTPATIENT)
Dept: GENERAL RADIOLOGY | Age: 89
End: 2025-09-05
Payer: MEDICARE

## 2025-09-05 PROBLEM — R40.3 VEGETATIVE STATE (HCC): Status: ACTIVE | Noted: 2025-09-05

## 2025-09-05 LAB
ALBUMIN SERPL-MCNC: 2.2 G/DL (ref 3.4–5)
ANION GAP SERPL CALCULATED.3IONS-SCNC: 10 MMOL/L (ref 3–16)
ANTI-XA UNFRAC HEPARIN: 0.35 IU/ML (ref 0.3–0.7)
BASOPHILS # BLD: 0 K/UL (ref 0–0.2)
BASOPHILS NFR BLD: 0.2 %
BUN SERPL-MCNC: 9 MG/DL (ref 7–20)
CA-I BLD-SCNC: 1.1 MMOL/L (ref 1.12–1.32)
CA-I BLD-SCNC: 1.15 MMOL/L (ref 1.12–1.32)
CALCIUM SERPL-MCNC: 8.8 MG/DL (ref 8.3–10.6)
CHLORIDE SERPL-SCNC: 104 MMOL/L (ref 99–110)
CO2 SERPL-SCNC: 23 MMOL/L (ref 21–32)
CREAT SERPL-MCNC: 1.1 MG/DL (ref 0.8–1.3)
DEPRECATED RDW RBC AUTO: 20.7 % (ref 12.4–15.4)
EOSINOPHIL # BLD: 0.1 K/UL (ref 0–0.6)
EOSINOPHIL NFR BLD: 0.6 %
GFR SERPLBLD CREATININE-BSD FMLA CKD-EPI: 62 ML/MIN/{1.73_M2}
GLUCOSE BLD-MCNC: 86 MG/DL (ref 70–99)
GLUCOSE BLD-MCNC: 96 MG/DL (ref 70–99)
GLUCOSE BLD-MCNC: 99 MG/DL (ref 70–99)
GLUCOSE SERPL-MCNC: 107 MG/DL (ref 70–99)
HCT VFR BLD AUTO: 24.2 % (ref 40.5–52.5)
HGB BLD-MCNC: 7.6 G/DL (ref 13.5–17.5)
LYMPHOCYTES # BLD: 1.3 K/UL (ref 1–5.1)
LYMPHOCYTES NFR BLD: 10 %
MAGNESIUM SERPL-MCNC: 2.26 MG/DL (ref 1.8–2.4)
MCH RBC QN AUTO: 26.8 PG (ref 26–34)
MCHC RBC AUTO-ENTMCNC: 31.4 G/DL (ref 31–36)
MCV RBC AUTO: 85.2 FL (ref 80–100)
MONOCYTES # BLD: 0.8 K/UL (ref 0–1.3)
MONOCYTES NFR BLD: 6.4 %
NEUTROPHILS # BLD: 10.9 K/UL (ref 1.7–7.7)
NEUTROPHILS NFR BLD: 82.8 %
PERFORMED ON: NORMAL
PH BLDV: 7.42 [PH] (ref 7.35–7.45)
PH BLDV: 7.43 [PH] (ref 7.35–7.45)
PHOSPHATE SERPL-MCNC: 2 MG/DL (ref 2.5–4.9)
PLATELET # BLD AUTO: 247 K/UL (ref 135–450)
PMV BLD AUTO: 6.7 FL (ref 5–10.5)
POTASSIUM SERPL-SCNC: 4.2 MMOL/L (ref 3.5–5.1)
RBC # BLD AUTO: 2.84 M/UL (ref 4.2–5.9)
SODIUM SERPL-SCNC: 137 MMOL/L (ref 136–145)
WBC # BLD AUTO: 13.2 K/UL (ref 4–11)

## 2025-09-05 PROCEDURE — 85025 COMPLETE CBC W/AUTO DIFF WBC: CPT

## 2025-09-05 PROCEDURE — 2580000003 HC RX 258: Performed by: INTERNAL MEDICINE

## 2025-09-05 PROCEDURE — 82330 ASSAY OF CALCIUM: CPT

## 2025-09-05 PROCEDURE — 6360000002 HC RX W HCPCS: Performed by: EMERGENCY MEDICINE

## 2025-09-05 PROCEDURE — 99291 CRITICAL CARE FIRST HOUR: CPT | Performed by: INTERNAL MEDICINE

## 2025-09-05 PROCEDURE — 90945 DIALYSIS ONE EVALUATION: CPT

## 2025-09-05 PROCEDURE — 80069 RENAL FUNCTION PANEL: CPT

## 2025-09-05 PROCEDURE — 94761 N-INVAS EAR/PLS OXIMETRY MLT: CPT

## 2025-09-05 PROCEDURE — 2580000003 HC RX 258: Performed by: HOSPITALIST

## 2025-09-05 PROCEDURE — 2500000003 HC RX 250 WO HCPCS

## 2025-09-05 PROCEDURE — 36592 COLLECT BLOOD FROM PICC: CPT

## 2025-09-05 PROCEDURE — 6360000002 HC RX W HCPCS: Performed by: INTERNAL MEDICINE

## 2025-09-05 PROCEDURE — 6360000002 HC RX W HCPCS: Performed by: HOSPITALIST

## 2025-09-05 PROCEDURE — 2000000000 HC ICU R&B

## 2025-09-05 PROCEDURE — 83735 ASSAY OF MAGNESIUM: CPT

## 2025-09-05 PROCEDURE — 2580000003 HC RX 258

## 2025-09-05 PROCEDURE — 6360000002 HC RX W HCPCS: Performed by: STUDENT IN AN ORGANIZED HEALTH CARE EDUCATION/TRAINING PROGRAM

## 2025-09-05 PROCEDURE — 2500000003 HC RX 250 WO HCPCS: Performed by: HOSPITALIST

## 2025-09-05 PROCEDURE — 36556 INSERT NON-TUNNEL CV CATH: CPT

## 2025-09-05 PROCEDURE — 2700000000 HC OXYGEN THERAPY PER DAY

## 2025-09-05 PROCEDURE — 71045 X-RAY EXAM CHEST 1 VIEW: CPT

## 2025-09-05 PROCEDURE — 85520 HEPARIN ASSAY: CPT

## 2025-09-05 RX ORDER — PIPERACILLIN SODIUM, TAZOBACTAM SODIUM 3; .375 G/15ML; G/15ML
3375 INJECTION, POWDER, LYOPHILIZED, FOR SOLUTION INTRAVENOUS EVERY 8 HOURS
Status: DISCONTINUED | OUTPATIENT
Start: 2025-09-05 | End: 2025-09-05

## 2025-09-05 RX ORDER — SODIUM CHLORIDE 9 MG/ML
INJECTION, SOLUTION INTRAVENOUS CONTINUOUS
Status: DISCONTINUED | OUTPATIENT
Start: 2025-09-05 | End: 2025-09-07 | Stop reason: HOSPADM

## 2025-09-05 RX ORDER — PIPERACILLIN SODIUM, TAZOBACTAM SODIUM 3; .375 G/15ML; G/15ML
3375 INJECTION, POWDER, LYOPHILIZED, FOR SOLUTION INTRAVENOUS EVERY 12 HOURS
Status: DISCONTINUED | OUTPATIENT
Start: 2025-09-05 | End: 2025-09-05

## 2025-09-05 RX ADMIN — TIMOLOL MALEATE 1 DROP: 5 SOLUTION OPHTHALMIC at 21:25

## 2025-09-05 RX ADMIN — PIPERACILLIN AND TAZOBACTAM 3375 MG: 3; .375 INJECTION, POWDER, LYOPHILIZED, FOR SOLUTION INTRAVENOUS at 13:34

## 2025-09-05 RX ADMIN — HEPARIN SODIUM 1700 UNITS: 1000 INJECTION, SOLUTION INTRAVENOUS; SUBCUTANEOUS at 08:52

## 2025-09-05 RX ADMIN — Medication: at 03:11

## 2025-09-05 RX ADMIN — HEPARIN SODIUM 18 UNITS/KG/HR: 10000 INJECTION, SOLUTION INTRAVENOUS at 03:01

## 2025-09-05 RX ADMIN — CALCIUM GLUCONATE 1000 MG: 20 INJECTION, SOLUTION INTRAVENOUS at 01:09

## 2025-09-05 RX ADMIN — SODIUM PHOSPHATE, MONOBASIC, MONOHYDRATE AND SODIUM PHOSPHATE, DIBASIC, ANHYDROUS 6 MMOL: 142; 276 INJECTION, SOLUTION INTRAVENOUS at 08:02

## 2025-09-05 RX ADMIN — SODIUM CHLORIDE: 0.9 INJECTION, SOLUTION INTRAVENOUS at 13:35

## 2025-09-05 RX ADMIN — SODIUM CHLORIDE: 0.9 INJECTION, SOLUTION INTRAVENOUS at 01:15

## 2025-09-05 RX ADMIN — SODIUM CHLORIDE: 0.9 INJECTION, SOLUTION INTRAVENOUS at 05:01

## 2025-09-05 RX ADMIN — HEPARIN SODIUM 18 UNITS/KG/HR: 10000 INJECTION, SOLUTION INTRAVENOUS at 17:55

## 2025-09-05 RX ADMIN — SODIUM CHLORIDE, PRESERVATIVE FREE 10 ML: 5 INJECTION INTRAVENOUS at 21:26

## 2025-09-05 RX ADMIN — DORZOLAMIDE HYDROCHLORIDE 1 DROP: 20 SOLUTION/ DROPS OPHTHALMIC at 08:14

## 2025-09-05 RX ADMIN — MEROPENEM 500 MG: 500 INJECTION INTRAVENOUS at 01:15

## 2025-09-05 RX ADMIN — TIMOLOL MALEATE 1 DROP: 5 SOLUTION OPHTHALMIC at 08:14

## 2025-09-05 RX ADMIN — SODIUM CHLORIDE: 0.9 INJECTION, SOLUTION INTRAVENOUS at 17:51

## 2025-09-05 RX ADMIN — PANTOPRAZOLE SODIUM 40 MG: 40 INJECTION, POWDER, FOR SOLUTION INTRAVENOUS at 08:11

## 2025-09-05 RX ADMIN — HEPARIN SODIUM 1800 UNITS: 1000 INJECTION, SOLUTION INTRAVENOUS; SUBCUTANEOUS at 08:54

## 2025-09-05 RX ADMIN — SODIUM CHLORIDE, PRESERVATIVE FREE 10 ML: 5 INJECTION INTRAVENOUS at 08:11

## 2025-09-05 RX ADMIN — DORZOLAMIDE HYDROCHLORIDE 1 DROP: 20 SOLUTION/ DROPS OPHTHALMIC at 21:25

## 2025-09-05 ASSESSMENT — PAIN SCALES - GENERAL
PAINLEVEL_OUTOF10: 0

## 2025-09-06 ENCOUNTER — APPOINTMENT (OUTPATIENT)
Dept: GENERAL RADIOLOGY | Age: 89
End: 2025-09-06
Payer: MEDICARE

## 2025-09-06 VITALS
OXYGEN SATURATION: 79 % | DIASTOLIC BLOOD PRESSURE: 55 MMHG | BODY MASS INDEX: 19.31 KG/M2 | RESPIRATION RATE: 22 BRPM | TEMPERATURE: 97.1 F | HEIGHT: 64 IN | HEART RATE: 74 BPM | WEIGHT: 113.1 LBS | SYSTOLIC BLOOD PRESSURE: 117 MMHG

## 2025-09-06 LAB
ALBUMIN SERPL-MCNC: 2.1 G/DL (ref 3.4–5)
ANION GAP SERPL CALCULATED.3IONS-SCNC: 11 MMOL/L (ref 3–16)
ANTI-XA UNFRAC HEPARIN: 0.24 IU/ML (ref 0.3–0.7)
ANTI-XA UNFRAC HEPARIN: 0.72 IU/ML (ref 0.3–0.7)
BACTERIA BLD CULT ORG #2: ABNORMAL
BACTERIA BLD CULT ORG #2: NORMAL
BACTERIA BLD CULT: NORMAL
BASOPHILS # BLD: 0.1 K/UL (ref 0–0.2)
BASOPHILS NFR BLD: 0.7 %
BUN SERPL-MCNC: 17 MG/DL (ref 7–20)
CALCIUM SERPL-MCNC: 8.6 MG/DL (ref 8.3–10.6)
CHLORIDE SERPL-SCNC: 107 MMOL/L (ref 99–110)
CO2 SERPL-SCNC: 23 MMOL/L (ref 21–32)
CREAT SERPL-MCNC: 2 MG/DL (ref 0.8–1.3)
DEPRECATED RDW RBC AUTO: 21.3 % (ref 12.4–15.4)
EOSINOPHIL # BLD: 0.1 K/UL (ref 0–0.6)
EOSINOPHIL NFR BLD: 0.7 %
GFR SERPLBLD CREATININE-BSD FMLA CKD-EPI: 30 ML/MIN/{1.73_M2}
GLUCOSE BLD-MCNC: 102 MG/DL (ref 70–99)
GLUCOSE BLD-MCNC: 79 MG/DL (ref 70–99)
GLUCOSE BLD-MCNC: 84 MG/DL (ref 70–99)
GLUCOSE BLD-MCNC: 90 MG/DL (ref 70–99)
GLUCOSE SERPL-MCNC: 103 MG/DL (ref 70–99)
HCT VFR BLD AUTO: 24.6 % (ref 40.5–52.5)
HGB BLD-MCNC: 7.7 G/DL (ref 13.5–17.5)
LYMPHOCYTES # BLD: 1.1 K/UL (ref 1–5.1)
LYMPHOCYTES NFR BLD: 9.7 %
MCH RBC QN AUTO: 26.8 PG (ref 26–34)
MCHC RBC AUTO-ENTMCNC: 31.3 G/DL (ref 31–36)
MCV RBC AUTO: 85.5 FL (ref 80–100)
MONOCYTES # BLD: 0.7 K/UL (ref 0–1.3)
MONOCYTES NFR BLD: 6.3 %
NEUTROPHILS # BLD: 9.5 K/UL (ref 1.7–7.7)
NEUTROPHILS NFR BLD: 82.6 %
ORGANISM: ABNORMAL
PERFORMED ON: ABNORMAL
PERFORMED ON: NORMAL
PHOSPHATE SERPL-MCNC: 3.2 MG/DL (ref 2.5–4.9)
PLATELET # BLD AUTO: 263 K/UL (ref 135–450)
PMV BLD AUTO: 6.8 FL (ref 5–10.5)
POTASSIUM SERPL-SCNC: 4.6 MMOL/L (ref 3.5–5.1)
RBC # BLD AUTO: 2.88 M/UL (ref 4.2–5.9)
SODIUM SERPL-SCNC: 141 MMOL/L (ref 136–145)
WBC # BLD AUTO: 11.5 K/UL (ref 4–11)

## 2025-09-06 PROCEDURE — 6360000002 HC RX W HCPCS: Performed by: HOSPITALIST

## 2025-09-06 PROCEDURE — 85025 COMPLETE CBC W/AUTO DIFF WBC: CPT

## 2025-09-06 PROCEDURE — 6360000002 HC RX W HCPCS: Performed by: INTERNAL MEDICINE

## 2025-09-06 PROCEDURE — 80069 RENAL FUNCTION PANEL: CPT

## 2025-09-06 PROCEDURE — 74018 RADEX ABDOMEN 1 VIEW: CPT

## 2025-09-06 PROCEDURE — 2500000003 HC RX 250 WO HCPCS

## 2025-09-06 PROCEDURE — 6360000002 HC RX W HCPCS: Performed by: EMERGENCY MEDICINE

## 2025-09-06 PROCEDURE — 2580000003 HC RX 258: Performed by: INTERNAL MEDICINE

## 2025-09-06 PROCEDURE — 2700000000 HC OXYGEN THERAPY PER DAY

## 2025-09-06 PROCEDURE — 90935 HEMODIALYSIS ONE EVALUATION: CPT

## 2025-09-06 PROCEDURE — 94761 N-INVAS EAR/PLS OXIMETRY MLT: CPT

## 2025-09-06 PROCEDURE — 6360000002 HC RX W HCPCS: Performed by: STUDENT IN AN ORGANIZED HEALTH CARE EDUCATION/TRAINING PROGRAM

## 2025-09-06 PROCEDURE — 85520 HEPARIN ASSAY: CPT

## 2025-09-06 RX ORDER — HEPARIN SODIUM 1000 [USP'U]/ML
3500 INJECTION, SOLUTION INTRAVENOUS; SUBCUTANEOUS PRN
Status: DISCONTINUED | OUTPATIENT
Start: 2025-09-06 | End: 2025-09-07 | Stop reason: HOSPADM

## 2025-09-06 RX ADMIN — EPOETIN ALFA-EPBX 5000 UNITS: 10000 INJECTION, SOLUTION INTRAVENOUS; SUBCUTANEOUS at 17:01

## 2025-09-06 RX ADMIN — TIMOLOL MALEATE 1 DROP: 5 SOLUTION OPHTHALMIC at 09:33

## 2025-09-06 RX ADMIN — PIPERACILLIN AND TAZOBACTAM 3375 MG: 3; .375 INJECTION, POWDER, LYOPHILIZED, FOR SOLUTION INTRAVENOUS at 01:56

## 2025-09-06 RX ADMIN — HEPARIN SODIUM 19 UNITS/KG/HR: 10000 INJECTION, SOLUTION INTRAVENOUS at 16:49

## 2025-09-06 RX ADMIN — HEPARIN SODIUM 2100 UNITS: 1000 INJECTION, SOLUTION INTRAVENOUS; SUBCUTANEOUS at 06:46

## 2025-09-06 RX ADMIN — DORZOLAMIDE HYDROCHLORIDE 1 DROP: 20 SOLUTION/ DROPS OPHTHALMIC at 09:33

## 2025-09-06 RX ADMIN — HEPARIN SODIUM 3500 UNITS: 1000 INJECTION, SOLUTION INTRAVENOUS; SUBCUTANEOUS at 11:16

## 2025-09-06 RX ADMIN — PIPERACILLIN AND TAZOBACTAM 3375 MG: 3; .375 INJECTION, POWDER, LYOPHILIZED, FOR SOLUTION INTRAVENOUS at 12:35

## 2025-09-06 RX ADMIN — PANTOPRAZOLE SODIUM 40 MG: 40 INJECTION, POWDER, FOR SOLUTION INTRAVENOUS at 09:33

## 2025-09-06 RX ADMIN — SODIUM CHLORIDE, PRESERVATIVE FREE 10 ML: 5 INJECTION INTRAVENOUS at 09:33

## 2025-09-06 ASSESSMENT — PAIN SCALES - GENERAL
PAINLEVEL_OUTOF10: 0